# Patient Record
Sex: FEMALE | Race: WHITE | NOT HISPANIC OR LATINO | Employment: FULL TIME | URBAN - NONMETROPOLITAN AREA
[De-identification: names, ages, dates, MRNs, and addresses within clinical notes are randomized per-mention and may not be internally consistent; named-entity substitution may affect disease eponyms.]

---

## 2017-01-10 PROCEDURE — G0123 SCREEN CERV/VAG THIN LAYER: HCPCS | Performed by: OBSTETRICS & GYNECOLOGY

## 2017-01-11 ENCOUNTER — LAB REQUISITION (OUTPATIENT)
Dept: LAB | Facility: HOSPITAL | Age: 62
End: 2017-01-11

## 2017-01-11 DIAGNOSIS — Z12.72 ENCOUNTER FOR SCREENING FOR MALIGNANT NEOPLASM OF VAGINA: ICD-10-CM

## 2017-01-11 LAB
GEN CATEG CVX/VAG CYTO-IMP: NORMAL
LAB AP CASE REPORT: NORMAL
LAB AP GYN ADDITIONAL INFORMATION: NORMAL
Lab: NORMAL
PATH INTERP SPEC-IMP: NORMAL
STAT OF ADQ CVX/VAG CYTO-IMP: NORMAL

## 2017-01-17 ENCOUNTER — OFFICE VISIT (OUTPATIENT)
Dept: RETAIL CLINIC | Facility: CLINIC | Age: 62
End: 2017-01-17

## 2017-01-17 VITALS
OXYGEN SATURATION: 98 % | HEART RATE: 103 BPM | WEIGHT: 130.2 LBS | DIASTOLIC BLOOD PRESSURE: 78 MMHG | SYSTOLIC BLOOD PRESSURE: 128 MMHG | TEMPERATURE: 99.5 F | RESPIRATION RATE: 20 BRPM

## 2017-01-17 DIAGNOSIS — J40 BRONCHITIS: Primary | ICD-10-CM

## 2017-01-17 PROCEDURE — 99213 OFFICE O/P EST LOW 20 MIN: CPT | Performed by: NURSE PRACTITIONER

## 2017-01-17 RX ORDER — DESVENLAFAXINE 100 MG/1
100 TABLET, EXTENDED RELEASE ORAL DAILY
COMMUNITY

## 2017-01-17 RX ORDER — AZITHROMYCIN 250 MG/1
TABLET, FILM COATED ORAL
Qty: 6 TABLET | Refills: 0 | Status: SHIPPED | OUTPATIENT
Start: 2017-01-17 | End: 2017-11-29

## 2017-01-17 NOTE — PROGRESS NOTES
Subjective   Jenni Leon is a 61 y.o. female here for bronchitis.     Cough   This is a new problem. The current episode started 1 to 4 weeks ago (1 week). The problem has been gradually worsening. The problem occurs every few minutes. The cough is non-productive. Associated symptoms include headaches (coughing so much head hurts) and postnasal drip. Pertinent negatives include no chest pain, chills, ear congestion, ear pain, eye redness, fever, hemoptysis, myalgias, nasal congestion, rash, rhinorrhea, sore throat, shortness of breath, sweats, weight loss or wheezing. Nothing aggravates the symptoms. She has tried prescription cough suppressant for the symptoms. The treatment provided mild relief. Her past medical history is significant for bronchitis. There is no history of asthma or environmental allergies.       The following portions of the patient's history were reviewed and updated as appropriate: allergies, current medications, past family history, past medical history, past social history, past surgical history and problem list.    Review of Systems   Constitutional: Negative for activity change, chills, fever and weight loss.   HENT: Positive for postnasal drip. Negative for congestion, dental problem, ear pain, rhinorrhea and sore throat.    Eyes: Negative for discharge and redness.   Respiratory: Positive for cough. Negative for hemoptysis, choking, chest tightness, shortness of breath and wheezing.    Cardiovascular: Negative for chest pain.   Gastrointestinal: Negative for diarrhea, nausea and vomiting.   Genitourinary: Negative for dysuria.   Musculoskeletal: Negative for arthralgias, back pain, joint swelling and myalgias.   Skin: Negative for rash.   Allergic/Immunologic: Negative for environmental allergies, food allergies and immunocompromised state.   Neurological: Positive for headaches (coughing so much head hurts). Negative for seizures.   Psychiatric/Behavioral: Positive for dysphoric mood  (They just changed her back to Pristiq trying to manage depression).       Objective   Physical Exam   Constitutional: She is oriented to person, place, and time. She appears well-developed and well-nourished. No distress.   HENT:   Head: Normocephalic and atraumatic.   Right Ear: External ear normal.   Left Ear: External ear normal.   Nose: Nose normal.   Mouth/Throat: Oropharynx is clear and moist. No oropharyngeal exudate.   TM's without erythema or effusion; pharynx is erythematous with post-nasal drainage   Eyes: Conjunctivae and EOM are normal. Pupils are equal, round, and reactive to light. Right eye exhibits no discharge. Left eye exhibits no discharge. No scleral icterus.   Neck: Normal range of motion. Neck supple. No JVD present. No tracheal deviation present. No thyromegaly present.   Cardiovascular: Normal rate, regular rhythm and normal heart sounds.  Exam reveals no gallop and no friction rub.    No murmur heard.  Pulmonary/Chest: Effort normal and breath sounds normal. No stridor. No respiratory distress. She has no wheezes. She has no rales. She exhibits no tenderness.   A harsh, barking, persistent cough is noted throughout her time in the waiting room and exam room.   Abdominal: Soft. She exhibits no distension. There is no tenderness.   Musculoskeletal: Normal range of motion. She exhibits no edema, tenderness or deformity.   Lymphadenopathy:     She has no cervical adenopathy.   Neurological: She is alert and oriented to person, place, and time. She exhibits normal muscle tone. Coordination normal.   Skin: Skin is warm and dry. No rash noted. She is not diaphoretic. No erythema. No pallor.   Psychiatric: She has a normal mood and affect. Her behavior is normal. Judgment and thought content normal.   Vitals reviewed.      Assessment/Plan   Jenni was seen today for cough.    Diagnoses and all orders for this visit:    Bronchitis    Other orders  -     azithromycin (ZITHROMAX Z-YNES) 250 MG tablet;  Take 2 tablets the first day, then 1 tablet daily for 4 days.         She has Flonase and Claritin that she can add and use at home.  We have opted for no systemic steroid treatment due to the fact that she had hair loss with steroids in the past, and she is changing anti-depression medications.

## 2017-01-17 NOTE — PATIENT INSTRUCTIONS
Increase fluids and rest.  Use the Claritin and Flonase that you have at home. Follow up if any questions or concerns.

## 2017-01-17 NOTE — MR AVS SNAPSHOT
Jenni Leon   1/17/2017 1:15 PM   Office Visit    Dept Phone:  [phone]   Encounter #:  [id number]    Provider:  [facility] [place]   Department:  [facility]                Your Full Care Plan              Today's Medication Changes          These changes are accurate as of: 1/17/17  4:12 PM.  If you have any questions, ask your nurse or doctor.               New Medication(s)Ordered:     azithromycin 250 MG tablet   Commonly known as:  ZITHROMAX Z-YNES   Take 2 tablets the first day, then 1 tablet daily for 4 days.         Stop taking medication(s)listed here:     amoxicillin 875 MG tablet   Commonly known as:  AMOXIL           cefuroxime 250 MG tablet   Commonly known as:  CEFTIN           VIIBRYD 10 MG tablet tablet   Generic drug:  vilazodone                Where to Get Your Medications      These medications were sent to [facility]/pharmacy #[id number] [place], [place] - [address] - [phone] [place] [phone] [place] [id number]     Phone:  [phone]     azithromycin 250 MG tablet                  Your Updated Medication List          This list is accurate as of: 1/17/17  4:12 PM.  Always use your most recent med list.                azithromycin 250 MG tablet   Commonly known as:  ZITHROMAX Z-YNES   Take 2 tablets the first day, then 1 tablet daily for 4 days.       esomeprazole 40 MG capsule   Commonly known as:  nexIUM       ESTRACE 1 MG tablet   Generic drug:  estradiol       loratadine 10 MG tablet   Commonly known as:  CLARITIN   Take 1 tablet by mouth Daily.       PRISTIQ 100 MG 24 hr tablet   Generic drug:  desvenlafaxine       promethazine-phenylephrine 6.25-5 MG/5ML syrup syrup   Take 5 mL by mouth Every 6 (Six) Hours As Needed (cough).               You Were Diagnosed With        Codes Comments    Bronchitis    -  Primary ICD-10-CM: J40  ICD-9-CM: 490       Instructions    Increase fluids and rest.  Use the Claritin and Flonase  that you have at home. Follow up if any questions or concerns.       Patient Instructions History      Upcoming Appointments     Visit Type Date Time Department    OFFICE VISIT 2017  1:15 PM [facility] [place]      [employer] Signup     [facility] allows you to send messages to your doctor, view your test results, renew your prescriptions, schedule appointments, and more. To sign up, go to [employer] and click on the Sign Up Now link in the New User? box. Enter your [employer] Activation Code exactly as it appears below along with the last four digits of your Social Security Number and your Date of Birth () to complete the sign-up process. If you do not sign up before the expiration date, you must request a new code.    Wish[employer]ie Activation Code: [email or web address]  Expires: 2017  3:27 PM    If you have questions, you can email [email or web address] or call [phone] to talk to our [employer] staff. Remember, [employer]ie is NOT to be used for urgent needs. For medical emergencies, dial 911.               Other Info from Your Visit           Allergies     No Known Allergies      Reason for Visit     Cough           Vital Signs     Blood Pressure Pulse Temperature Respirations Weight Oxygen Saturation    128/78 103 99.5 °F (37.5 °C) (Oral) 20 130 lb 3.2 oz (59.1 kg) 98%    Smoking Status                   Never Smoker           Problems and Diagnoses Noted     Bronchitis    -  Primary

## 2017-01-18 ENCOUNTER — TELEPHONE (OUTPATIENT)
Dept: RETAIL CLINIC | Facility: CLINIC | Age: 62
End: 2017-01-18

## 2017-01-18 NOTE — TELEPHONE ENCOUNTER
She is still coughing and wonders about a different cough syrup. We again discussed steroid and also steroid and/or proventil inhaler, all of which she defers at this time. She defers Tessalon Perles, which she has tried before. Will try to take 2 tsp Phenergan VC tonight and see if it helps cough. If not, she will call tomorrow.

## 2017-01-23 RX ORDER — ESOMEPRAZOLE MAGNESIUM 40 MG/1
40 CAPSULE, DELAYED RELEASE ORAL DAILY
Qty: 30 CAPSULE | Refills: 3 | Status: SHIPPED | OUTPATIENT
Start: 2017-01-23 | End: 2017-11-29

## 2017-07-05 ENCOUNTER — LAB REQUISITION (OUTPATIENT)
Dept: LAB | Facility: HOSPITAL | Age: 62
End: 2017-07-05

## 2017-07-05 DIAGNOSIS — Z00.00 ENCOUNTER FOR GENERAL ADULT MEDICAL EXAMINATION WITHOUT ABNORMAL FINDINGS: ICD-10-CM

## 2017-07-05 PROCEDURE — 88305 TISSUE EXAM BY PATHOLOGIST: CPT | Performed by: OBSTETRICS & GYNECOLOGY

## 2017-07-07 LAB
CYTO UR: NORMAL
LAB AP CASE REPORT: NORMAL
LAB AP CLINICAL INFORMATION: NORMAL
LAB AP INTRADEPARTMENTAL CONSULT: NORMAL
Lab: NORMAL
PATH REPORT.FINAL DX SPEC: NORMAL
PATH REPORT.GROSS SPEC: NORMAL

## 2017-10-03 ENCOUNTER — OFFICE VISIT (OUTPATIENT)
Dept: RETAIL CLINIC | Facility: CLINIC | Age: 62
End: 2017-10-03

## 2017-10-03 DIAGNOSIS — Z23 NEEDS FLU SHOT: Primary | ICD-10-CM

## 2017-10-04 VITALS — RESPIRATION RATE: 20 BRPM | OXYGEN SATURATION: 99 % | TEMPERATURE: 98.7 F | HEART RATE: 89 BPM

## 2017-10-04 NOTE — PROGRESS NOTES
"Patient seen for flu vaccine.  No complaints except a little \"sniffly\" today.  NKA, no hx of vaccine reaction.  I gave Fluzone IM left deltoid, and patient tolerated this well.   "

## 2017-11-29 ENCOUNTER — OFFICE VISIT (OUTPATIENT)
Dept: RETAIL CLINIC | Facility: CLINIC | Age: 62
End: 2017-11-29

## 2017-11-29 DIAGNOSIS — J40 BRONCHITIS: Primary | ICD-10-CM

## 2017-11-29 DIAGNOSIS — J06.9 ACUTE URI: ICD-10-CM

## 2017-11-29 PROCEDURE — 99213 OFFICE O/P EST LOW 20 MIN: CPT | Performed by: NURSE PRACTITIONER

## 2017-11-29 RX ORDER — AZITHROMYCIN 250 MG/1
TABLET, FILM COATED ORAL
Qty: 6 TABLET | Refills: 0 | Status: SHIPPED | OUTPATIENT
Start: 2017-11-29 | End: 2018-11-28

## 2017-11-29 RX ORDER — PROMETHAZINE HYDROCHLORIDE AND PHENYLEPHRINE HYDROCHLORIDE 6.25; 5 MG/5ML; MG/5ML
5 SYRUP ORAL EVERY 6 HOURS PRN
Qty: 240 ML | Refills: 0 | Status: SHIPPED | OUTPATIENT
Start: 2017-11-29 | End: 2018-11-28 | Stop reason: SDUPTHER

## 2017-11-29 RX ORDER — PREGABALIN 50 MG/1
50 CAPSULE ORAL 3 TIMES DAILY
COMMUNITY
End: 2020-07-09 | Stop reason: ALTCHOICE

## 2017-11-29 RX ORDER — FLUTICASONE PROPIONATE 50 MCG
1 SPRAY, SUSPENSION (ML) NASAL DAILY
Qty: 1 BOTTLE | Refills: 0 | Status: SHIPPED | OUTPATIENT
Start: 2017-11-29 | End: 2017-12-29

## 2017-12-01 VITALS
HEART RATE: 100 BPM | TEMPERATURE: 98.6 F | SYSTOLIC BLOOD PRESSURE: 114 MMHG | DIASTOLIC BLOOD PRESSURE: 85 MMHG | RESPIRATION RATE: 20 BRPM | OXYGEN SATURATION: 96 % | WEIGHT: 138 LBS

## 2017-12-01 NOTE — PROGRESS NOTES
Subjective   Jenni Leon is a 62 y.o. female here for cough.     Cough   This is a new problem. The current episode started 1 to 4 weeks ago (3 weeks). The problem has been gradually worsening. The problem occurs every few minutes. The cough is non-productive. Associated symptoms include nasal congestion, postnasal drip and a sore throat (feels raw). Pertinent negatives include no chest pain, chills, ear congestion, ear pain, eye redness, fever, headaches, heartburn, hemoptysis, myalgias, rash, rhinorrhea, shortness of breath, sweats, weight loss or wheezing. The symptoms are aggravated by lying down. She has tried nothing for the symptoms. Her past medical history is significant for bronchitis. There is no history of asthma, COPD, emphysema, environmental allergies or pneumonia.   Sore Throat    Associated symptoms include congestion and coughing. Pertinent negatives include no abdominal pain, diarrhea, ear pain, headaches, neck pain, shortness of breath, stridor, trouble swallowing or vomiting.       The following portions of the patient's history were reviewed and updated as appropriate: allergies, current medications, past family history, past medical history, past social history, past surgical history and problem list.    Review of Systems   Constitutional: Negative for activity change, appetite change, chills, diaphoresis, fatigue, fever and weight loss.   HENT: Positive for congestion, postnasal drip, sore throat (feels raw) and voice change (a little hoarse). Negative for ear pain, mouth sores, nosebleeds, rhinorrhea, sinus pain, sinus pressure and trouble swallowing.    Eyes: Negative for discharge and redness.   Respiratory: Positive for cough. Negative for apnea, hemoptysis, chest tightness, shortness of breath, wheezing and stridor.    Cardiovascular: Negative for chest pain, palpitations and leg swelling.   Gastrointestinal: Negative for abdominal pain, diarrhea, heartburn, nausea and vomiting.    Genitourinary: Negative for dysuria.   Musculoskeletal: Negative for arthralgias, back pain, gait problem, joint swelling, myalgias, neck pain and neck stiffness.   Skin: Negative for color change, pallor and rash.   Allergic/Immunologic: Negative for environmental allergies, food allergies and immunocompromised state.   Neurological: Negative for seizures and headaches.   Hematological: Negative for adenopathy.   Psychiatric/Behavioral: Negative for agitation and behavioral problems.       Objective   Physical Exam   Constitutional: She is oriented to person, place, and time. She appears well-developed and well-nourished. No distress.   HENT:   Head: Normocephalic and atraumatic.   Right Ear: External ear normal.   Left Ear: External ear normal.   Nose: Nose normal.   Mouth/Throat: Oropharynx is clear and moist. No oropharyngeal exudate (post-nasal drainage noted).   TM's WNL bilaterally   Eyes: Conjunctivae and EOM are normal. Pupils are equal, round, and reactive to light. Right eye exhibits no discharge. Left eye exhibits no discharge. No scleral icterus.   Neck: Normal range of motion. Neck supple. No JVD present. No tracheal deviation present. No thyromegaly present.   Cardiovascular: Normal rate, regular rhythm and normal heart sounds.  Exam reveals no gallop and no friction rub.    No murmur heard.  Pulmonary/Chest: Effort normal and breath sounds normal. No stridor. No respiratory distress. She has no wheezes. She has no rales.   hacky cough noted   Abdominal: Soft. She exhibits no distension. There is no tenderness.   Musculoskeletal: Normal range of motion. She exhibits no edema, tenderness or deformity.   Lymphadenopathy:     She has no cervical adenopathy.   Neurological: She is alert and oriented to person, place, and time. She exhibits normal muscle tone. Coordination normal.   Skin: Skin is warm and dry. No rash noted. She is not diaphoretic. No erythema. No pallor.   Psychiatric: She has a normal  mood and affect. Her behavior is normal. Judgment and thought content normal.   Nursing note and vitals reviewed.      Assessment/Plan   Jenni was seen today for cough and sore throat.    Diagnoses and all orders for this visit:    Bronchitis    Acute URI    Other orders  -     azithromycin (ZITHROMAX Z-YNES) 250 MG tablet; Take 2 tablets the first day, then 1 tablet daily for 4 days.  -     promethazine-phenylephrine 6.25-5 MG/5ML syrup syrup; Take 5 mL by mouth Every 6 (Six) Hours As Needed (cough).  -     fluticasone (FLONASE) 50 MCG/ACT nasal spray; 1 spray into each nostril Daily for 30 days.

## 2018-10-17 ENCOUNTER — OFFICE VISIT (OUTPATIENT)
Dept: RETAIL CLINIC | Facility: CLINIC | Age: 63
End: 2018-10-17

## 2018-10-17 VITALS — TEMPERATURE: 98.8 F

## 2018-10-17 DIAGNOSIS — Z23 NEED FOR VACCINATION: Primary | ICD-10-CM

## 2018-11-28 ENCOUNTER — OFFICE VISIT (OUTPATIENT)
Dept: RETAIL CLINIC | Facility: CLINIC | Age: 63
End: 2018-11-28

## 2018-11-28 VITALS
HEART RATE: 96 BPM | RESPIRATION RATE: 20 BRPM | OXYGEN SATURATION: 98 % | DIASTOLIC BLOOD PRESSURE: 88 MMHG | SYSTOLIC BLOOD PRESSURE: 130 MMHG | TEMPERATURE: 97.7 F

## 2018-11-28 DIAGNOSIS — J06.9 ACUTE URI: ICD-10-CM

## 2018-11-28 DIAGNOSIS — J40 BRONCHITIS: Primary | ICD-10-CM

## 2018-11-28 PROCEDURE — 99213 OFFICE O/P EST LOW 20 MIN: CPT | Performed by: NURSE PRACTITIONER

## 2018-11-28 RX ORDER — FLUTICASONE PROPIONATE 50 MCG
1 SPRAY, SUSPENSION (ML) NASAL DAILY
Qty: 1 BOTTLE | Refills: 0 | Status: SHIPPED | OUTPATIENT
Start: 2018-11-28 | End: 2018-12-24 | Stop reason: SDUPTHER

## 2018-11-28 RX ORDER — ALBUTEROL SULFATE 90 UG/1
1 AEROSOL, METERED RESPIRATORY (INHALATION) EVERY 6 HOURS PRN
Qty: 1 INHALER | Refills: 0 | Status: SHIPPED | OUTPATIENT
Start: 2018-11-28 | End: 2020-07-09

## 2018-11-28 RX ORDER — PROMETHAZINE HYDROCHLORIDE AND PHENYLEPHRINE HYDROCHLORIDE 6.25; 5 MG/5ML; MG/5ML
5 SYRUP ORAL EVERY 6 HOURS PRN
Qty: 240 ML | Refills: 0 | Status: SHIPPED | OUTPATIENT
Start: 2018-11-28 | End: 2020-07-09

## 2018-11-28 RX ORDER — CEFUROXIME AXETIL 250 MG/1
250 TABLET ORAL 2 TIMES DAILY
Qty: 20 TABLET | Refills: 0 | Status: SHIPPED | OUTPATIENT
Start: 2018-11-28 | End: 2020-07-09

## 2018-11-28 NOTE — PROGRESS NOTES
Subjective   Jenni Leon is a 63 y.o. female seen for cough.     Cough   This is a new problem. The current episode started 1 to 4 weeks ago. The problem has been gradually worsening. The problem occurs every few minutes. Cough characteristics: Mostly dry, occasionally productive of yellow sputum. Associated symptoms include nasal congestion and postnasal drip. Pertinent negatives include no chest pain, chills, ear congestion, ear pain, fever, headaches, hemoptysis, myalgias, rash, rhinorrhea, sore throat, shortness of breath, weight loss or wheezing. Nothing aggravates the symptoms. She has tried prescription cough suppressant for the symptoms. The treatment provided mild relief. Her past medical history is significant for bronchitis. There is no history of asthma, COPD, emphysema, environmental allergies or pneumonia.       The following portions of the patient's history were reviewed and updated as appropriate: allergies, current medications, past family history, past medical history, past social history, past surgical history and problem list.    Review of Systems   Constitutional: Negative for chills, fever and weight loss.   HENT: Positive for congestion and postnasal drip. Negative for ear pain, rhinorrhea, sinus pressure, sore throat, trouble swallowing and voice change.    Respiratory: Positive for cough. Negative for hemoptysis, shortness of breath and wheezing.    Cardiovascular: Negative for chest pain and leg swelling.   Gastrointestinal: Positive for nausea. Negative for abdominal pain, diarrhea and vomiting.   Genitourinary: Negative for dysuria.   Musculoskeletal: Negative for myalgias.   Skin: Negative for rash.   Allergic/Immunologic: Negative for environmental allergies.   Neurological: Negative for headaches.       Objective   Physical Exam   Constitutional: She is oriented to person, place, and time. She appears well-developed and well-nourished. No distress.   HENT:   Head: Normocephalic  and atraumatic.   Right Ear: External ear normal.   Left Ear: External ear normal.   Nose: Nose normal.   Mouth/Throat: No oropharyngeal exudate (post-nasal drainage).   TM's WNL   Eyes: Conjunctivae and EOM are normal. Pupils are equal, round, and reactive to light. Right eye exhibits no discharge. Left eye exhibits no discharge. No scleral icterus.   Neck: Normal range of motion. Neck supple. No JVD present. No tracheal deviation present. No thyromegaly present.   Cardiovascular: Normal rate and regular rhythm. Exam reveals no gallop and no friction rub.   No murmur heard.  Pulmonary/Chest: Effort normal and breath sounds normal. No respiratory distress. She has no wheezes. She has no rales.   Persistent, barky cough noted throughout the time of the exam.   Abdominal: Soft. She exhibits no distension. There is no tenderness.   Musculoskeletal: Normal range of motion. She exhibits no edema or deformity.   Lymphadenopathy:     She has no cervical adenopathy.   Neurological: She is alert and oriented to person, place, and time. She exhibits normal muscle tone. Coordination normal.   Skin: Skin is warm and dry. No rash noted. She is not diaphoretic. No erythema. No pallor.   Psychiatric: She has a normal mood and affect. Her behavior is normal. Judgment and thought content normal.   Nursing note and vitals reviewed.      Assessment/Plan   Jenni was seen today for cough.    Diagnoses and all orders for this visit:    Bronchitis    Acute URI    Other orders  -     fluticasone (FLONASE) 50 MCG/ACT nasal spray; 1 spray into the nostril(s) as directed by provider Daily for 30 days.  -     promethazine-phenylephrine 6.25-5 MG/5ML syrup syrup; Take 5 mL by mouth Every 6 (Six) Hours As Needed (cough).  -     cefuroxime (CEFTIN) 250 MG tablet; Take 1 tablet by mouth 2 (Two) Times a Day.  -     albuterol (PROVENTIL HFA;VENTOLIN HFA) 108 (90 Base) MCG/ACT inhaler; Inhale 1 puff Every 6 (Six) Hours As Needed for Wheezing or  Shortness of Air (cough).       Have asked her to let me know if sx worsen or do not improve with treatment. She defers steroid shot because her hair falls out with steroids.

## 2018-12-25 RX ORDER — FLUTICASONE PROPIONATE 50 MCG
SPRAY, SUSPENSION (ML) NASAL
Qty: 16 ML | Refills: 0 | Status: SHIPPED | OUTPATIENT
Start: 2018-12-25 | End: 2020-07-09

## 2019-01-24 RX ORDER — FLUTICASONE PROPIONATE 50 MCG
SPRAY, SUSPENSION (ML) NASAL
Qty: 16 ML | Refills: 0 | OUTPATIENT
Start: 2019-01-24

## 2019-03-23 ENCOUNTER — NURSE TRIAGE (OUTPATIENT)
Dept: CALL CENTER | Facility: HOSPITAL | Age: 64
End: 2019-03-23

## 2019-03-23 NOTE — TELEPHONE ENCOUNTER
Reviewed guideline with caller, advises she be seen within 4 hours. Caller states she just now checked and her temperature is down to 100.4. Advised caller to be seen today. Caller agrees to this advice.     Reason for Disposition  • [1] Fever > 101 F (38.3 C) AND [2] age > 60    Additional Information  • Negative: Shock suspected (e.g., cold/pale/clammy skin, too weak to stand, low BP, rapid pulse)  • Negative: Difficult to awaken or acting confused (e.g., disoriented, slurred speech)  • Negative: [1] Difficulty breathing AND [2] bluish lips, tongue or face  • Negative: New onset rash with multiple purple (or blood-colored) spots or dots  • Negative: Sounds like a life-threatening emergency to the triager  • Negative: Fever in a cancer patient who is currently (or recently) receiving chemotherapy or radiation therapy, or cancer patient who has metastatic or end-stage cancer and is receiving palliative care  • Negative: Pregnant  • Negative: Fever onset within 24 hours of receiving vaccine  • Negative: [1] Fever AND [2] within 21 days of Ebola EXPOSURE  • Negative: Other symptom is present, see that guideline  (e.g., symptoms of cough, runny nose, sore throat, earache, abdominal pain, diarrhea, vomiting)  • Negative: [1] Headache AND [2] stiff neck (can't touch chin to chest)  • Negative: Difficulty breathing  • Negative: IV drug abuse  • Negative: [1] Drinking very little AND [2] dehydration suspected (e.g., no urine > 12 hours, very dry mouth, very lightheaded)  • Negative: Patient sounds very sick or weak to the triager  (Exception: mild weakness and hasn't taken fever medicine)  • Negative: Fever > 104 F (40 C)  • Negative: [1] Fever > 100.0 F (37.8 C) AND [2] bedridden (e.g., nursing home patient, CVA, chronic illness, recovering from surgery)  • Negative: [1] Fever > 100.0 F (37.8 C) AND [2] indwelling urinary catheter (e.g., Dug[place], Coude)  • Negative: [1] Fever > 100.0 F (37.8 C) AND [2] has port (portacath),  "central line, or PICC line  • Negative: [1] Fever > 100.0 F (37.8 C) AND [2] diabetes mellitus or weak immune system (e.g., HIV positive, cancer chemo, splenectomy, chronic steroids)  • Negative: [1] Fever > 100.0 F (37.8 C) AND [2] surgery in the last month  • Negative: Transplant patient (e.g., kidney, liver, lung, heart)  • Negative: Fever present > 3 days (72 hours)  • Negative: [1] Fever > 100.0 F (37.8 C) AND [2] foreign travel to a developing country in the last month  • Negative: [1] Intermittent fever > 100.0 F (37.8 C) AND [2] lasts > 3 weeks    Answer Assessment - Initial Assessment Questions  1. TEMPERATURE: \"What is the most recent temperature?\"  \"How was it measured?\"       104.7, 102, temporal thermometer  2. ONSET: \"When did the fever start?\"       tonight  3. SYMPTOMS: \"Do you have any other symptoms besides the fever?\"  (e.g., colds, headache, sore throat, earache, cough, rash, diarrhea, vomiting, abdominal pain)      Sinus congestion and cough, headache,   4. CAUSE: If there are no symptoms, ask: \"What do you think is causing the fever?\"       Sinus congestion  5. CONTACTS: \"Does anyone else in the family have an infection?\"      no  6. TREATMENT: \"What have you done so far to treat this fever?\" (e.g., medications)      Ibuprofen 400mg  7. IMMUNOCOMPROMISE: \"Do you have of the following: diabetes, HIV positive, splenectomy, cancer chemotherapy, chronic steroid treatment, transplant patient, etc.\"      no  8. PREGNANCY: \"Is there any chance you are pregnant?\" \"When was your last menstrual period?\"      no  9. TRAVEL: \"Have you traveled out of the country in the last month?\" (e.g., travel history, exposures)      no    Protocols used: FEVER-ADULT-AH      "

## 2019-09-05 ENCOUNTER — TRANSCRIBE ORDERS (OUTPATIENT)
Dept: ADMINISTRATIVE | Facility: HOSPITAL | Age: 64
End: 2019-09-05

## 2019-09-05 DIAGNOSIS — R07.89 OTHER CHEST PAIN: Primary | ICD-10-CM

## 2019-09-09 ENCOUNTER — HOSPITAL ENCOUNTER (OUTPATIENT)
Dept: CARDIOLOGY | Facility: HOSPITAL | Age: 64
Discharge: HOME | End: 2019-09-09
Admitting: INTERNAL MEDICINE

## 2019-09-09 VITALS
BODY MASS INDEX: 22.33 KG/M2 | HEIGHT: 65 IN | WEIGHT: 134 LBS | HEART RATE: 80 BPM | SYSTOLIC BLOOD PRESSURE: 126 MMHG | DIASTOLIC BLOOD PRESSURE: 70 MMHG

## 2019-09-09 PROCEDURE — 25010000002 PERFLUTREN 6.52 MG/ML SUSPENSION: Performed by: INTERNAL MEDICINE

## 2019-09-09 PROCEDURE — 93017 CV STRESS TEST TRACING ONLY: CPT

## 2019-09-09 PROCEDURE — 93018 CV STRESS TEST I&R ONLY: CPT | Performed by: INTERNAL MEDICINE

## 2019-09-09 PROCEDURE — 93350 STRESS TTE ONLY: CPT | Performed by: INTERNAL MEDICINE

## 2019-09-09 PROCEDURE — 93352 ADMIN ECG CONTRAST AGENT: CPT | Performed by: INTERNAL MEDICINE

## 2019-09-09 PROCEDURE — 93350 STRESS TTE ONLY: CPT

## 2019-09-09 RX ORDER — ROSUVASTATIN CALCIUM 5 MG/1
5 TABLET, COATED ORAL 3 TIMES WEEKLY
COMMUNITY
End: 2021-10-08

## 2019-09-09 RX ORDER — MELOXICAM 7.5 MG/1
7.5 TABLET ORAL DAILY
COMMUNITY

## 2019-09-09 RX ADMIN — PERFLUTREN 8.48 MG: 6.52 INJECTION, SUSPENSION INTRAVENOUS at 14:33

## 2019-09-10 LAB
BH CV STRESS BP STAGE 1: NORMAL
BH CV STRESS BP STAGE 2: NORMAL
BH CV STRESS DURATION MIN STAGE 1: 3
BH CV STRESS DURATION MIN STAGE 2: 3
BH CV STRESS DURATION SEC STAGE 1: 0
BH CV STRESS DURATION SEC STAGE 2: 0
BH CV STRESS GRADE STAGE 1: 10
BH CV STRESS GRADE STAGE 2: 12
BH CV STRESS HR STAGE 1: 126
BH CV STRESS HR STAGE 2: 159
BH CV STRESS METS STAGE 1: 5
BH CV STRESS METS STAGE 2: 7.5
BH CV STRESS PROTOCOL 1: NORMAL
BH CV STRESS RECOVERY BP: NORMAL MMHG
BH CV STRESS RECOVERY HR: 96 BPM
BH CV STRESS SPEED STAGE 1: 1.7
BH CV STRESS SPEED STAGE 2: 2.5
BH CV STRESS STAGE 1: 1
BH CV STRESS STAGE 2: 2
MAXIMAL PREDICTED HEART RATE: 158 BPM
PERCENT MAX PREDICTED HR: 100.63 %
STRESS BASELINE BP: NORMAL MMHG
STRESS BASELINE HR: 80 BPM
STRESS PERCENT HR: 118 %
STRESS POST ESTIMATED WORKLOAD: 7.5 METS
STRESS POST EXERCISE DUR MIN: 6 MIN
STRESS POST EXERCISE DUR SEC: 0 SEC
STRESS POST PEAK BP: NORMAL MMHG
STRESS POST PEAK HR: 159 BPM
STRESS TARGET HR: 134 BPM

## 2019-10-02 ENCOUNTER — IMMUNIZATION (OUTPATIENT)
Dept: RETAIL CLINIC | Facility: CLINIC | Age: 64
End: 2019-10-02

## 2019-10-02 DIAGNOSIS — Z23 NEED FOR VACCINATION: Primary | ICD-10-CM

## 2019-10-02 PROCEDURE — 90686 IIV4 VACC NO PRSV 0.5 ML IM: CPT | Performed by: NURSE PRACTITIONER

## 2019-10-02 PROCEDURE — 90471 IMMUNIZATION ADMIN: CPT | Performed by: NURSE PRACTITIONER

## 2019-10-06 VITALS — TEMPERATURE: 98.3 F

## 2019-10-06 NOTE — PROGRESS NOTES
Seen for flu vaccination. No history of reaction to vaccines, no egg allergy, is not feeling sick today.   Flu shot given IM by me without complication. Patient tolerated well.  Patient information handout given.

## 2020-07-09 ENCOUNTER — OFFICE VISIT (OUTPATIENT)
Dept: GASTROENTEROLOGY | Facility: CLINIC | Age: 65
End: 2020-07-09

## 2020-07-09 VITALS
OXYGEN SATURATION: 99 % | BODY MASS INDEX: 22.33 KG/M2 | SYSTOLIC BLOOD PRESSURE: 128 MMHG | DIASTOLIC BLOOD PRESSURE: 76 MMHG | WEIGHT: 134 LBS | TEMPERATURE: 98.2 F | HEART RATE: 90 BPM | HEIGHT: 65 IN

## 2020-07-09 DIAGNOSIS — Z12.11 COLON CANCER SCREENING: Primary | ICD-10-CM

## 2020-07-09 DIAGNOSIS — Z83.71 FAMILY HISTORY OF POLYPS IN THE COLON: ICD-10-CM

## 2020-07-09 PROBLEM — Z83.719 FAMILY HISTORY OF POLYPS IN THE COLON: Status: ACTIVE | Noted: 2020-07-09

## 2020-07-09 PROCEDURE — S0285 CNSLT BEFORE SCREEN COLONOSC: HCPCS | Performed by: NURSE PRACTITIONER

## 2020-07-09 RX ORDER — BUSPIRONE HYDROCHLORIDE 7.5 MG/1
7.5 TABLET ORAL AS NEEDED
COMMUNITY
End: 2020-08-10

## 2020-07-09 RX ORDER — SODIUM, POTASSIUM,MAG SULFATES 17.5-3.13G
1 SOLUTION, RECONSTITUTED, ORAL ORAL EVERY 12 HOURS
Qty: 2 BOTTLE | Refills: 0 | Status: ON HOLD | OUTPATIENT
Start: 2020-07-09 | End: 2020-07-17

## 2020-07-09 RX ORDER — GABAPENTIN 300 MG/1
300 CAPSULE ORAL 2 TIMES DAILY
COMMUNITY
Start: 2020-04-27

## 2020-07-09 NOTE — H&P (VIEW-ONLY)
Chief Complaint   Patient presents with   • Colon Cancer Screening     Pt presents today for colon recall-last colon was 5/14/2015; Family history of colon polyps     Subjective   HPI  Jenni Ahn is a 64 y.o. female who presents as a referral for preventative maintenance. She has no complaints of nausea or vomiting. No change in bowels. No wt loss. No BRBPR. No melena. There is no family hx for colon cancer. No abdominal pain. There was no Cologuard screening this year.  Patient's last colonoscopy was performed on 5/14/2015 found to be normal.    Past Medical History:   Diagnosis Date   • Allergic    • Anxiety    • Depression    • Elevated cholesterol    • Family history of colonic polyps    • GERD (gastroesophageal reflux disease)    • IBS (irritable bowel syndrome)    • Kidney stone    • Sinusitis    • Skin cancer    • Urinary tract infection      Past Surgical History:   Procedure Laterality Date   • APPENDECTOMY     • BREAUX PROCEDURE  07/29/2015   • COLONOSCOPY  05/14/2015    Normal; Repeat 5 years   • COLONOSCOPY  08/17/2007    Normal; Repeat 7 years   • DIAGNOSTIC LAPAROSCOPY EXPLORATORY LAPAROTOMY      For endometriosis   • ENDOSCOPY  07/29/2015    Medium-sized HH; Normal stomach; Normal examined duodenum; BRAVO pH capsule deployed; No specimens collected   • ENDOSCOPY  05/14/2015    HH; Schatzki's ring-dilated to 20mm; LA grade B esophagitis   • HYSTERECTOMY     • TONSILLECTOMY         Current Outpatient Medications:   •  busPIRone (BUSPAR) 7.5 MG tablet, Take 7.5 mg by mouth As Needed., Disp: , Rfl:   •  desvenlafaxine (PRISTIQ) 100 MG 24 hr tablet, Take 100 mg by mouth Daily., Disp: , Rfl:   •  esomeprazole (nexIUM) 40 MG capsule, Take 1 capsule by mouth every morning (before breakfast), Disp: , Rfl:   •  estradiol (ESTRACE) 1 MG tablet, Take 0.5 mg by mouth Daily., Disp: , Rfl:   •  gabapentin (NEURONTIN) 300 MG capsule, Take 300 mg by mouth 2 (Two) Times a Day., Disp: , Rfl:   •  meloxicam (MOBIC) 7.5  "MG tablet, Take 7.5 mg by mouth Daily., Disp: , Rfl:   •  rosuvastatin (CRESTOR) 5 MG tablet, Take 5 mg by mouth 1 (One) Time Per Week., Disp: , Rfl:   •  sodium-potassium-magnesium sulfates (Suprep Bowel Prep Kit) 17.5-3.13-1.6 GM/177ML solution oral solution, Take 1 bottle by mouth Every 12 (Twelve) Hours. Split dose prep as directed by office instructions provided.  2 bottles = one kit., Disp: 2 bottle, Rfl: 0  No Known Allergies  Social History     Socioeconomic History   • Marital status: Legally      Spouse name: Not on file   • Number of children: Not on file   • Years of education: Not on file   • Highest education level: Not on file   Tobacco Use   • Smoking status: Never Smoker   • Smokeless tobacco: Never Used   Substance and Sexual Activity   • Alcohol use: Yes     Comment: Rarely   • Drug use: Never   • Sexual activity: Yes     Family History   Problem Relation Age of Onset   • Breast cancer Mother    • Colon polyps Mother    • Stroke Father    • Colon cancer Neg Hx    • Esophageal cancer Neg Hx    • Liver cancer Neg Hx    • Liver disease Neg Hx    • Rectal cancer Neg Hx    • Stomach cancer Neg Hx        REVIEW OF SYSTEMS  General: well appearing, no fever chills or sweats, no unexplained wt loss  HEENT: no acute visual or hearing disturbances  Cardiovascular: No chest pain or palpitations  Pulmonary: No shortness of breath, coughing, wheezing or hemoptysis  : No burning, urgency, hematuria, or dysuria  Musculoskeletal: No joint pain or stiffness  Peripheral: no edema  Skin: No lesions or rashes  Neuro: No dizziness, headaches, stroke, syncope  Endocrine: No hot or cold intolerances  Hematological: No blood dyscrasias    Objective   Vitals:    07/09/20 1424   BP: 128/76   BP Location: Left arm   Patient Position: Sitting   Cuff Size: Adult   Pulse: 90   Temp: 98.2 °F (36.8 °C)   TempSrc: Tympanic   SpO2: 99%   Weight: 60.8 kg (134 lb)   Height: 163.8 cm (64.5\")     Body mass index is 22.65 " kg/m².    PHYSICAL EXAM  General: age appropriate well nourished well appearing, no acute distress  Head: normocephalic and atraumatic  Global assessment-supple  Neck-No JVD noted, no lymphadenopathy  Pulmonary-clear to auscultation bilaterally, normal respiratory effort  Cardiovascular-normal rate and rhythm, normal heart sounds, S1 and S2 noted  Abdomen-soft, non tender, non distended, normal bowel sounds all 4 quadrants, no hepatosplenomegaly noted  Extremities-No clubbing cyanosis or edema  Neuro-Non focal, converses appropriately, awake, alert, oriented        Imaging Results (Most Recent)     None        Assessment/Plan   Jenni was seen today for colon cancer screening.    Diagnoses and all orders for this visit:    Colon cancer screening  -     Case Request; Standing  -     Case Request    Family history of polyps in the colon  Comments:  mother   Orders:  -     Case Request; Standing  -     Case Request    Other orders  -     Follow Anesthesia Guidelines / Protocol; Future  -     Obtain Informed Consent; Future  -     sodium-potassium-magnesium sulfates (Suprep Bowel Prep Kit) 17.5-3.13-1.6 GM/177ML solution oral solution; Take 1 bottle by mouth Every 12 (Twelve) Hours. Split dose prep as directed by office instructions provided.  2 bottles = one kit.      COLONOSCOPY WITH ANESTHESIA (N/A)       Body mass index is 22.65 kg/m². Patient's Body mass index is 22.65 kg/m². BMI is within normal parameters. No follow-up required..      All risks, benefits, alternatives, and indications of colonoscopy procedure have been discussed with the patient. Risks to include perforation of the colon requiring possible surgery or colostomy, risk of bleeding from biopsies or removal of colon tissue, possibility of missing a colon polyp or cancer, or adverse drug reaction.  Benefits to include the diagnosis and management of disease of the colon and rectum. Alternatives to include barium enema, radiographic evaluation, lab  testing or no intervention. Pt verbalizes understanding and agrees.

## 2020-07-10 ENCOUNTER — TRANSCRIBE ORDERS (OUTPATIENT)
Dept: ADMINISTRATIVE | Facility: HOSPITAL | Age: 65
End: 2020-07-10

## 2020-07-10 DIAGNOSIS — Z01.818 PRE-OP TESTING: Primary | ICD-10-CM

## 2020-07-14 ENCOUNTER — LAB (OUTPATIENT)
Dept: LAB | Facility: HOSPITAL | Age: 65
End: 2020-07-14

## 2020-07-14 PROCEDURE — U0003 INFECTIOUS AGENT DETECTION BY NUCLEIC ACID (DNA OR RNA); SEVERE ACUTE RESPIRATORY SYNDROME CORONAVIRUS 2 (SARS-COV-2) (CORONAVIRUS DISEASE [COVID-19]), AMPLIFIED PROBE TECHNIQUE, MAKING USE OF HIGH THROUGHPUT TECHNOLOGIES AS DESCRIBED BY CMS-2020-01-R: HCPCS | Performed by: INTERNAL MEDICINE

## 2020-07-14 PROCEDURE — C9803 HOPD COVID-19 SPEC COLLECT: HCPCS | Performed by: INTERNAL MEDICINE

## 2020-07-15 LAB
COVID LABCORP PRIORITY: NORMAL
SARS-COV-2 RNA RESP QL NAA+PROBE: NOT DETECTED

## 2020-07-17 ENCOUNTER — ANESTHESIA (OUTPATIENT)
Dept: GASTROENTEROLOGY | Facility: HOSPITAL | Age: 65
End: 2020-07-17

## 2020-07-17 ENCOUNTER — TELEPHONE (OUTPATIENT)
Dept: GASTROENTEROLOGY | Facility: CLINIC | Age: 65
End: 2020-07-17

## 2020-07-17 ENCOUNTER — HOSPITAL ENCOUNTER (OUTPATIENT)
Facility: HOSPITAL | Age: 65
Setting detail: HOSPITAL OUTPATIENT SURGERY
Discharge: HOME | End: 2020-07-17
Attending: INTERNAL MEDICINE | Admitting: INTERNAL MEDICINE

## 2020-07-17 ENCOUNTER — ANESTHESIA EVENT (OUTPATIENT)
Dept: GASTROENTEROLOGY | Facility: HOSPITAL | Age: 65
End: 2020-07-17

## 2020-07-17 VITALS
RESPIRATION RATE: 18 BRPM | SYSTOLIC BLOOD PRESSURE: 101 MMHG | WEIGHT: 133 LBS | HEART RATE: 81 BPM | BODY MASS INDEX: 22.71 KG/M2 | OXYGEN SATURATION: 98 % | HEIGHT: 64 IN | DIASTOLIC BLOOD PRESSURE: 61 MMHG | TEMPERATURE: 98.2 F

## 2020-07-17 PROCEDURE — 25010000002 PROPOFOL 10 MG/ML EMULSION: Performed by: NURSE ANESTHETIST, CERTIFIED REGISTERED

## 2020-07-17 PROCEDURE — G0105 COLORECTAL SCRN; HI RISK IND: HCPCS | Performed by: INTERNAL MEDICINE

## 2020-07-17 RX ORDER — PROPOFOL 10 MG/ML
VIAL (ML) INTRAVENOUS AS NEEDED
Status: DISCONTINUED | OUTPATIENT
Start: 2020-07-17 | End: 2020-07-17 | Stop reason: SURG

## 2020-07-17 RX ORDER — LIDOCAINE HYDROCHLORIDE 10 MG/ML
0.5 INJECTION, SOLUTION EPIDURAL; INFILTRATION; INTRACAUDAL; PERINEURAL ONCE AS NEEDED
Status: DISCONTINUED | OUTPATIENT
Start: 2020-07-17 | End: 2020-07-17 | Stop reason: HOSPADM

## 2020-07-17 RX ORDER — SODIUM CHLORIDE 0.9 % (FLUSH) 0.9 %
10 SYRINGE (ML) INJECTION AS NEEDED
Status: DISCONTINUED | OUTPATIENT
Start: 2020-07-17 | End: 2020-07-17 | Stop reason: HOSPADM

## 2020-07-17 RX ORDER — SODIUM CHLORIDE 9 MG/ML
500 INJECTION, SOLUTION INTRAVENOUS CONTINUOUS PRN
Status: DISCONTINUED | OUTPATIENT
Start: 2020-07-17 | End: 2020-07-17 | Stop reason: HOSPADM

## 2020-07-17 RX ADMIN — PROPOFOL 420 MG: 10 INJECTION, EMULSION INTRAVENOUS at 10:48

## 2020-07-17 RX ADMIN — LIDOCAINE HYDROCHLORIDE 100 MG: 20 INJECTION, SOLUTION INTRAVENOUS at 10:48

## 2020-07-17 RX ADMIN — SODIUM CHLORIDE 500 ML: 9 INJECTION, SOLUTION INTRAVENOUS at 09:28

## 2020-07-17 NOTE — ANESTHESIA POSTPROCEDURE EVALUATION
Patient: Jenni Anh    Procedure Summary     Date:  07/17/20 Room / Location:  [facility] ENDOSCOPY 6 / [facility] ENDOSCOPY    Anesthesia Start:  1041 Anesthesia Stop:  1106    Procedure:  COLONOSCOPY WITH ANESTHESIA (N/A ) Diagnosis:       Colon cancer screening      Family history of polyps in the colon      (Colon cancer screening [Z12.11])      (Family history of polyps in the colon [Z83.71])    Surgeon:  Geraldine Gonzalez MD Provider:  Maryann Villalta CRNA    Anesthesia Type:  MAC ASA Status:  2          Anesthesia Type: MAC    Vitals  Vitals Value Taken Time   /61 7/17/2020 11:25 AM   Temp     Pulse 81 7/17/2020 11:25 AM   Resp 18 7/17/2020 11:25 AM   SpO2 98 % 7/17/2020 11:25 AM   Vitals shown include unvalidated device data.        Post Anesthesia Care and Evaluation    Patient location during evaluation: PHASE II  Patient participation: complete - patient participated  Level of consciousness: awake and alert  Pain management: adequate  Airway patency: patent  Anesthetic complications: No anesthetic complications  PONV Status: none  Cardiovascular status: acceptable  Respiratory status: acceptable  Hydration status: acceptable  No anesthesia care post op

## 2020-07-17 NOTE — ANESTHESIA PREPROCEDURE EVALUATION
Anesthesia Evaluation     Patient summary reviewed and Nursing notes reviewed   no history of anesthetic complications:  NPO Solid Status: > 8 hours  NPO Liquid Status: > 2 hours           Airway   Mallampati: I  TM distance: >3 FB  Neck ROM: full  No difficulty expected  Dental      Pulmonary    Cardiovascular   Exercise tolerance: good (4-7 METS)    (+) hyperlipidemia,       Neuro/Psych  (+) psychiatric history Anxiety and Depression,     GI/Hepatic/Renal/Endo    (+)  GERD,  renal disease stones,     Musculoskeletal     Abdominal    Substance History      OB/GYN          Other                        Anesthesia Plan    ASA 2     MAC     intravenous induction     Anesthetic plan, all risks, benefits, and alternatives have been provided, discussed and informed consent has been obtained with: patient.

## 2020-08-10 ENCOUNTER — HOSPITAL ENCOUNTER (EMERGENCY)
Facility: HOSPITAL | Age: 65
Discharge: HOME | End: 2020-08-11
Attending: EMERGENCY MEDICINE | Admitting: EMERGENCY MEDICINE

## 2020-08-10 DIAGNOSIS — R11.2 NON-INTRACTABLE VOMITING WITH NAUSEA, UNSPECIFIED VOMITING TYPE: Primary | ICD-10-CM

## 2020-08-10 DIAGNOSIS — R19.7 DIARRHEA, UNSPECIFIED TYPE: ICD-10-CM

## 2020-08-10 DIAGNOSIS — R74.01 TRANSAMINITIS: ICD-10-CM

## 2020-08-10 LAB
ALBUMIN SERPL-MCNC: 4.7 G/DL (ref 3.5–5.2)
ALBUMIN/GLOB SERPL: 1.6 G/DL
ALP SERPL-CCNC: 132 U/L (ref 39–117)
ALT SERPL W P-5'-P-CCNC: 40 U/L (ref 1–33)
ANION GAP SERPL CALCULATED.3IONS-SCNC: 18 MMOL/L (ref 5–15)
AST SERPL-CCNC: 46 U/L (ref 1–32)
BASOPHILS # BLD AUTO: 0.03 10*3/MM3 (ref 0–0.2)
BASOPHILS NFR BLD AUTO: 0.3 % (ref 0–1.5)
BILIRUB SERPL-MCNC: 0.4 MG/DL (ref 0–1.2)
BUN SERPL-MCNC: 23 MG/DL (ref 8–23)
BUN/CREAT SERPL: 24.2 (ref 7–25)
CALCIUM SPEC-SCNC: 10.6 MG/DL (ref 8.6–10.5)
CHLORIDE SERPL-SCNC: 101 MMOL/L (ref 98–107)
CO2 SERPL-SCNC: 24 MMOL/L (ref 22–29)
CREAT SERPL-MCNC: 0.95 MG/DL (ref 0.57–1)
DEPRECATED RDW RBC AUTO: 39.8 FL (ref 37–54)
EOSINOPHIL # BLD AUTO: 0.08 10*3/MM3 (ref 0–0.4)
EOSINOPHIL NFR BLD AUTO: 0.8 % (ref 0.3–6.2)
ERYTHROCYTE [DISTWIDTH] IN BLOOD BY AUTOMATED COUNT: 12.9 % (ref 12.3–15.4)
GFR SERPL CREATININE-BSD FRML MDRD: 59 ML/MIN/1.73
GLOBULIN UR ELPH-MCNC: 3 GM/DL
GLUCOSE SERPL-MCNC: 165 MG/DL (ref 65–99)
HCT VFR BLD AUTO: 49.5 % (ref 34–46.6)
HGB BLD-MCNC: 16.5 G/DL (ref 12–15.9)
IMM GRANULOCYTES # BLD AUTO: 0.03 10*3/MM3 (ref 0–0.05)
IMM GRANULOCYTES NFR BLD AUTO: 0.3 % (ref 0–0.5)
LIPASE SERPL-CCNC: 202 U/L (ref 13–60)
LYMPHOCYTES # BLD AUTO: 5.13 10*3/MM3 (ref 0.7–3.1)
LYMPHOCYTES NFR BLD AUTO: 49.6 % (ref 19.6–45.3)
MCH RBC QN AUTO: 28.4 PG (ref 26.6–33)
MCHC RBC AUTO-ENTMCNC: 33.3 G/DL (ref 31.5–35.7)
MCV RBC AUTO: 85.3 FL (ref 79–97)
MONOCYTES # BLD AUTO: 0.41 10*3/MM3 (ref 0.1–0.9)
MONOCYTES NFR BLD AUTO: 4 % (ref 5–12)
NEUTROPHILS NFR BLD AUTO: 4.67 10*3/MM3 (ref 1.7–7)
NEUTROPHILS NFR BLD AUTO: 45 % (ref 42.7–76)
NRBC BLD AUTO-RTO: 0 /100 WBC (ref 0–0.2)
PLATELET # BLD AUTO: 371 10*3/MM3 (ref 140–450)
PMV BLD AUTO: 11.2 FL (ref 6–12)
POTASSIUM SERPL-SCNC: 3.6 MMOL/L (ref 3.5–5.2)
PROT SERPL-MCNC: 7.7 G/DL (ref 6–8.5)
RBC # BLD AUTO: 5.8 10*6/MM3 (ref 3.77–5.28)
SODIUM SERPL-SCNC: 143 MMOL/L (ref 136–145)
WBC # BLD AUTO: 10.35 10*3/MM3 (ref 3.4–10.8)

## 2020-08-10 PROCEDURE — 80053 COMPREHEN METABOLIC PANEL: CPT

## 2020-08-10 PROCEDURE — 96374 THER/PROPH/DIAG INJ IV PUSH: CPT

## 2020-08-10 PROCEDURE — 85025 COMPLETE CBC W/AUTO DIFF WBC: CPT

## 2020-08-10 PROCEDURE — 96375 TX/PRO/DX INJ NEW DRUG ADDON: CPT

## 2020-08-10 PROCEDURE — 99284 EMERGENCY DEPT VISIT MOD MDM: CPT

## 2020-08-10 PROCEDURE — 83690 ASSAY OF LIPASE: CPT

## 2020-08-10 RX ORDER — SODIUM CHLORIDE 0.9 % (FLUSH) 0.9 %
10 SYRINGE (ML) INJECTION AS NEEDED
Status: DISCONTINUED | OUTPATIENT
Start: 2020-08-10 | End: 2020-08-11 | Stop reason: HOSPADM

## 2020-08-11 ENCOUNTER — APPOINTMENT (OUTPATIENT)
Dept: CT IMAGING | Facility: HOSPITAL | Age: 65
End: 2020-08-11

## 2020-08-11 VITALS
BODY MASS INDEX: 26.46 KG/M2 | OXYGEN SATURATION: 99 % | HEIGHT: 64 IN | TEMPERATURE: 97.9 F | RESPIRATION RATE: 18 BRPM | WEIGHT: 155 LBS | HEART RATE: 81 BPM | DIASTOLIC BLOOD PRESSURE: 71 MMHG | SYSTOLIC BLOOD PRESSURE: 111 MMHG

## 2020-08-11 LAB
HOLD SPECIMEN: NORMAL
HOLD SPECIMEN: NORMAL
WHOLE BLOOD HOLD SPECIMEN: NORMAL
WHOLE BLOOD HOLD SPECIMEN: NORMAL

## 2020-08-11 PROCEDURE — 96375 TX/PRO/DX INJ NEW DRUG ADDON: CPT

## 2020-08-11 PROCEDURE — 25010000002 IOPAMIDOL 61 % SOLUTION: Performed by: EMERGENCY MEDICINE

## 2020-08-11 PROCEDURE — 25010000002 ONDANSETRON PER 1 MG: Performed by: EMERGENCY MEDICINE

## 2020-08-11 PROCEDURE — 25010000002 MORPHINE PER 10 MG: Performed by: EMERGENCY MEDICINE

## 2020-08-11 PROCEDURE — 74177 CT ABD & PELVIS W/CONTRAST: CPT

## 2020-08-11 PROCEDURE — 93005 ELECTROCARDIOGRAM TRACING: CPT | Performed by: EMERGENCY MEDICINE

## 2020-08-11 PROCEDURE — 96374 THER/PROPH/DIAG INJ IV PUSH: CPT

## 2020-08-11 PROCEDURE — 93010 ELECTROCARDIOGRAM REPORT: CPT | Performed by: INTERNAL MEDICINE

## 2020-08-11 RX ORDER — ONDANSETRON 4 MG/1
4 TABLET, ORALLY DISINTEGRATING ORAL 4 TIMES DAILY PRN
Qty: 15 TABLET | Refills: 0 | Status: SHIPPED | OUTPATIENT
Start: 2020-08-11 | End: 2021-10-08

## 2020-08-11 RX ORDER — MORPHINE SULFATE 10 MG/ML
6 INJECTION INTRAMUSCULAR; INTRAVENOUS; SUBCUTANEOUS ONCE
Status: COMPLETED | OUTPATIENT
Start: 2020-08-11 | End: 2020-08-11

## 2020-08-11 RX ORDER — ONDANSETRON 2 MG/ML
4 INJECTION INTRAMUSCULAR; INTRAVENOUS ONCE
Status: COMPLETED | OUTPATIENT
Start: 2020-08-11 | End: 2020-08-11

## 2020-08-11 RX ORDER — DICYCLOMINE HCL 20 MG
20 TABLET ORAL EVERY 6 HOURS PRN
Qty: 15 TABLET | Refills: 0 | Status: SHIPPED | OUTPATIENT
Start: 2020-08-11 | End: 2021-10-08

## 2020-08-11 RX ADMIN — SODIUM CHLORIDE 1000 ML: 9 INJECTION, SOLUTION INTRAVENOUS at 00:36

## 2020-08-11 RX ADMIN — ONDANSETRON HYDROCHLORIDE 4 MG: 2 SOLUTION INTRAMUSCULAR; INTRAVENOUS at 00:36

## 2020-08-11 RX ADMIN — MORPHINE SULFATE 6 MG: 10 INJECTION, SOLUTION INTRAMUSCULAR; INTRAVENOUS at 00:36

## 2020-08-11 RX ADMIN — IOPAMIDOL 100 ML: 612 INJECTION, SOLUTION INTRAVENOUS at 01:07

## 2020-08-11 NOTE — ED PROVIDER NOTES
Subjective   65 y/o female arrives for evaluation of nausea, vomiting, diarrhea for the last few hours with noted diffuse abdominal cramping. She denies ill contacts, abx usage, trips, travels, unusual ingestion, camping trips or other issues. She arrives in [facility].         Family, social and past history reviewed as below, prior documentation of H and Ps and other documentation are reviewed:    Past Medical History:  No date: Allergic  No date: Anxiety  No date: Depression  No date: Elevated cholesterol  No date: Family history of colonic polyps  No date: GERD (gastroesophageal reflux disease)  No date: IBS (irritable bowel syndrome)  No date: Kidney stone  No date: PONV (postoperative nausea and vomiting)  No date: Sinusitis  No date: Skin cancer  No date: Urinary tract infection    Past Surgical History:  No date: APPENDECTOMY  07/29/2015: BREAUX PROCEDURE  05/14/2015: COLONOSCOPY      Comment:  Normal; Repeat 5 years  08/17/2007: COLONOSCOPY      Comment:  Normal; Repeat 7 years  7/17/2020: COLONOSCOPY; N/A      Comment:  Procedure: COLONOSCOPY WITH ANESTHESIA;  Surgeon: Geraldine Gonzalez MD;  Location: [facility] ENDOSCOPY;  Service:                Gastroenterology;  Laterality: N/A;  preop; screening                postopPCP Sebastián Batista   No date: DIAGNOSTIC LAPAROSCOPY EXPLORATORY LAPAROTOMY      Comment:  For endometriosis  07/29/2015: ENDOSCOPY      Comment:  Medium-sized HH; Normal stomach; Normal examined                duodenum; BRAVO pH capsule deployed; No specimens                collected  05/14/2015: ENDOSCOPY      Comment:  HH; Schatzki's ring-dilated to 20mm; LA grade B                esophagitis  No date: HYSTERECTOMY  No date: TONSILLECTOMY    Social History    Socioeconomic History      Marital status:       Spouse name: Not on file      Number of children: Not on file      Years of education: Not on file      Highest education level: Not on file    Tobacco Use      Smoking status:  Never Smoker      Smokeless tobacco: Never Used    Substance and Sexual Activity      Alcohol use: Yes        Comment: Rarely      Drug use: Never      Sexual activity: Yes      Family history: reviewed and   noncontributory           Review of Systems   All other systems reviewed and are negative.      Past Medical History:   Diagnosis Date   • Allergic    • Anxiety    • Depression    • Elevated cholesterol    • Family history of colonic polyps    • GERD (gastroesophageal reflux disease)    • IBS (irritable bowel syndrome)    • Kidney stone    • PONV (postoperative nausea and vomiting)    • Sinusitis    • Skin cancer    • Urinary tract infection        No Known Allergies    Past Surgical History:   Procedure Laterality Date   • APPENDECTOMY     • BREAUX PROCEDURE  07/29/2015   • COLONOSCOPY  05/14/2015    Normal; Repeat 5 years   • COLONOSCOPY  08/17/2007    Normal; Repeat 7 years   • COLONOSCOPY N/A 7/17/2020    Procedure: COLONOSCOPY WITH ANESTHESIA;  Surgeon: Geraldine Gonzalez MD;  Location: [facility] ENDOSCOPY;  Service: Gastroenterology;  Laterality: N/A;  preop; screening   postop  PCP Sebastián Batista    • DIAGNOSTIC LAPAROSCOPY EXPLORATORY LAPAROTOMY      For endometriosis   • ENDOSCOPY  07/29/2015    Medium-sized HH; Normal stomach; Normal examined duodenum; BRAVO pH capsule deployed; No specimens collected   • ENDOSCOPY  05/14/2015    HH; Schatzki's ring-dilated to 20mm; LA grade B esophagitis   • HYSTERECTOMY     • TONSILLECTOMY         Family History   Problem Relation Age of Onset   • Breast cancer Mother    • Colon polyps Mother         unk age--she has passed away   • Stroke Father    • Colon cancer Neg Hx    • Esophageal cancer Neg Hx    • Liver cancer Neg Hx    • Liver disease Neg Hx    • Rectal cancer Neg Hx    • Stomach cancer Neg Hx        Social History     Socioeconomic History   • Marital status:      Spouse name: Not on file   • Number of children: Not on file   • Years of education: Not on  file   • Highest education level: Not on file   Tobacco Use   • Smoking status: Never Smoker   • Smokeless tobacco: Never Used   Substance and Sexual Activity   • Alcohol use: Yes     Comment: Rarely   • Drug use: Never   • Sexual activity: Yes           Objective   Physical Exam   Constitutional: She is oriented to person, place, and time. She appears well-developed and well-nourished.   HENT:   Head: Normocephalic and atraumatic.   Eyes: Pupils are equal, round, and reactive to light. Conjunctivae and EOM are normal.   Neck: Normal range of motion. Neck supple.   Cardiovascular: Regular rhythm, normal heart sounds and intact distal pulses. Tachycardia present.   Pulmonary/Chest: Effort normal and breath sounds normal.   Abdominal: Soft. Bowel sounds are normal. She exhibits no distension and no mass. There is generalized tenderness. There is no rebound and no guarding. No hernia.   Musculoskeletal: Normal range of motion.   Neurological: She is alert and oriented to person, place, and time.   Skin: Skin is warm. Capillary refill takes less than 2 seconds.   Psychiatric: She has a normal mood and affect. Her behavior is normal.   Vitals reviewed.      Procedures           ED Course  ED Course as of Aug 11 0259   Tue Aug 11, 2020   0155 States she feels improved will do po challenge.     [JH]   0255 CT showed entertiis and nospecitif gastritis.         Patient was able to tolerate po withou tissue and notes she feels much improved at ths time. No further vomiting nor diarrhea. She tells me she has episodes similar to this about once per year and does actually follow up with Dr. Gonzalez with normal colonsocpy around 3 weeks ago. I did explain that I wished her to follow up with Dr. Gonzalez given her slight tansamanitis. Will place on brat diet and encourage follow up and return.     []      ED Course User Index  [] Cachorro King MD            CT Abdomen Pelvis With Contrast    (Results Pending)     Labs  Reviewed   COMPREHENSIVE METABOLIC PANEL - Abnormal; Notable for the following components:       Result Value    Glucose 165 (*)     Calcium 10.6 (*)     ALT (SGPT) 40 (*)     AST (SGOT) 46 (*)     Alkaline Phosphatase 132 (*)     eGFR Non  Amer 59 (*)     Anion Gap 18.0 (*)     All other components within normal limits    Narrative:     GFR Normal >60  Chronic Kidney Disease <60  Kidney Failure <15     LIPASE - Abnormal; Notable for the following components:    Lipase 202 (*)     All other components within normal limits   CBC WITH AUTO DIFFERENTIAL - Abnormal; Notable for the following components:    RBC 5.80 (*)     Hemoglobin 16.5 (*)     Hematocrit 49.5 (*)     Lymphocyte % 49.6 (*)     Monocyte % 4.0 (*)     Lymphocytes, Absolute 5.13 (*)     All other components within normal limits   RAINBOW DRAW    Narrative:     The following orders were created for panel order [place] Draw.  Procedure                               Abnormality         Status                     ---------                               -----------         ------                     Light Blue Top[[id number]]                                   Final result               Green Top (Gel)[[id number]]                                  Final result               Lavender Top[[id number]]                                     Final result               Red Top[[id number]]                                          Final result                 Please view results for these tests on the individual orders.   LIGHT BLUE TOP   GREEN TOP   LAVENDER TOP   RED TOP   CBC AND DIFFERENTIAL    Narrative:     The following orders were created for panel order CBC & Differential.  Procedure                               Abnormality         Status                     ---------                               -----------         ------                     CBC Auto Differential[[id number]]        Abnormal            Final result                 Please view results for  these tests on the individual orders.                                       MDM    Final diagnoses:   Non-intractable vomiting with nausea, unspecified vomiting type   Diarrhea, unspecified type   Transaminitis            Cachorro King MD  08/11/20 5063

## 2020-08-11 NOTE — DISCHARGE INSTRUCTIONS
Jenni,    I am glad you feel better.   As we talked about, your liver enzymes are slightly elevated today. While this is likely from your vomiting, I do wish that you get these repeated to assure they are improving. Please try the BRAT diet for the next 12-24 hours and advance your diet as tolerated after this. Please be sure to return for return of symptoms or any other issues.

## 2020-08-12 ENCOUNTER — NURSE TRIAGE (OUTPATIENT)
Dept: CALL CENTER | Facility: HOSPITAL | Age: 65
End: 2020-08-12

## 2020-08-12 NOTE — TELEPHONE ENCOUNTER
"Seen at the ER 08/10/2020  Works at [employer]. Her boss told her to stay home today. He was asking if COVID testing was needed.. She has/had no symptoms of COVID. Explained since she had no symptoms the ER  MD did not feel the testing was needed.    Reason for Disposition  • General information question, no triage required and triager able to answer question    Additional Information  • Negative: [1] Caller is not with the adult (patient) AND [2] reporting urgent symptoms  • Negative: Lab result questions  • Negative: Medication questions  • Negative: Caller can't be reached by phone  • Negative: Caller has already spoken to PCP or another triager  • Negative: RN needs further essential information from caller in order to complete triage  • Negative: Requesting regular office appointment  • Negative: [1] Caller requesting NON-URGENT health information AND [2] PCP's office is the best resource  • Negative: Health Information question, no triage required and triager able to answer question    Answer Assessment - Initial Assessment Questions  1. REASON FOR CALL or QUESTION: \"What is your reason for calling today?\" or \"How can I best help you?\" or \"What question do you have that I can help answer?\"  See note    Protocols used: INFORMATION ONLY CALL - NO TRIAGE-ADULT-      "

## 2020-08-17 NOTE — ED NOTES
"ED Call Back Questions    1. How are you doing since leaving the Emergency Department?    Much better    2. Do you have any questions about your discharge instructions? No     3. Have you filled your new prescriptions yet? Yes   a. Do you have any questions about those medications? No     4. Were you able to make a follow-up appointment with the physician? Yes     5. Do you have a primary care physician? Yes   a. If No, would you like for me to set you up with one? N/A  i. If Yes, “I will have our ED  give you a call right back at this number to work with you on the best time for an appointment.”    6. We are always looking to get better at what we do. Do you have any suggestions for what we can do to be even better? No   a. If Yes, \"Thank you for sharing your concerns. I apologize. I will follow up with our manager and patient . Would you like someone to call you back?\" N/A    7. Is there anything else I can do for you? No    Pt states that care was great!       Alessandra Correa, RN  08/17/20 4736    "

## 2021-03-11 ENCOUNTER — BULK ORDERING (OUTPATIENT)
Dept: CASE MANAGEMENT | Facility: OTHER | Age: 66
End: 2021-03-11

## 2021-03-11 DIAGNOSIS — Z23 IMMUNIZATION DUE: ICD-10-CM

## 2021-10-08 ENCOUNTER — OFFICE VISIT (OUTPATIENT)
Dept: OBSTETRICS AND GYNECOLOGY | Facility: CLINIC | Age: 66
End: 2021-10-08

## 2021-10-08 VITALS
SYSTOLIC BLOOD PRESSURE: 108 MMHG | HEIGHT: 65 IN | BODY MASS INDEX: 22.49 KG/M2 | DIASTOLIC BLOOD PRESSURE: 82 MMHG | WEIGHT: 135 LBS

## 2021-10-08 DIAGNOSIS — Z01.419 WELL WOMAN EXAM WITH ROUTINE GYNECOLOGICAL EXAM: Primary | ICD-10-CM

## 2021-10-08 DIAGNOSIS — Z12.39 ENCOUNTER FOR SCREENING FOR MALIGNANT NEOPLASM OF BREAST, UNSPECIFIED SCREENING MODALITY: ICD-10-CM

## 2021-10-08 DIAGNOSIS — M85.80 OSTEOPENIA, SENILE: ICD-10-CM

## 2021-10-08 PROCEDURE — 99397 PER PM REEVAL EST PAT 65+ YR: CPT | Performed by: ADVANCED PRACTICE MIDWIFE

## 2021-10-08 RX ORDER — BUSPIRONE HYDROCHLORIDE 7.5 MG/1
7.5 TABLET ORAL 3 TIMES DAILY
COMMUNITY

## 2021-10-08 RX ORDER — SIMVASTATIN 80 MG
TABLET ORAL
COMMUNITY
Start: 2021-09-20 | End: 2021-10-08

## 2021-10-08 NOTE — PATIENT INSTRUCTIONS
"BMI for Adults  What is BMI?  Body mass index (BMI) is a number that is calculated from a person's weight and height. BMI can help estimate how much of a person's weight is composed of fat. BMI does not measure body fat directly. Rather, it is an alternative to procedures that directly measure body fat, which can be difficult and expensive.  BMI can help identify people who may be at higher risk for certain medical problems.  What are BMI measurements used for?  BMI is used as a screening tool to identify possible weight problems. It helps determine whether a person is obese, overweight, a healthy weight, or underweight.  BMI is useful for:  · Identifying a weight problem that may be related to a medical condition or may increase the risk for medical problems.  · Promoting changes, such as changes in diet and exercise, to help reach a healthy weight. BMI screening can be repeated to see if these changes are working.  How is BMI calculated?  BMI involves measuring your weight in relation to your height. Both height and weight are measured, and the BMI is calculated from those numbers. This can be done either in English (U.S[place] or metric measurements. Note that charts and online BMI calculators are available to help you find your BMI quickly and easily without having to do these calculations yourself.  To calculate your BMI in English (U.S[place] measurements:    1. Measure your weight in pounds (lb).  2. Multiply the number of pounds by 703.  ? For example, for a person who weighs 180 lb, multiply that number by 703, which equals 126,540.  3. Measure your height in inches. Then multiply that number by itself to get a measurement called \"inches squared.\"  ? For example, for a person who is 70 inches tall, the \"inches squared\" measurement is 70 inches x 70 inches, which equals 4,900 inches squared.  4. Divide the total from step 2 (number of lb x 703) by the total from step 3 (inches squared): 126,540 ÷ 4,900 = 25.8. This is " "your BMI.    To calculate your BMI in metric measurements:  1. Measure your weight in kilograms (kg).  2. Measure your height in meters (m). Then multiply that number by itself to get a measurement called \"meters squared.\"  ? For example, for a person who is 1.75 m tall, the \"meters squared\" measurement is 1.75 m x 1.75 m, which is equal to 3.1 meters squared.  3. Divide the number of kilograms (your weight) by the meters squared number. In this example: 70 ÷ 3.1 = 22.6. This is your BMI.  What do the results mean?  BMI charts are used to identify whether you are underweight, normal weight, overweight, or obese. The following guidelines will be used:  · Underweight: BMI less than 18.5.  · Normal weight: BMI between 18.5 and 24.9.  · Overweight: BMI between 25 and 29.9.  · Obese: BMI of 30 or above.  Keep these notes in mind:  · Weight includes both fat and muscle, so someone with a muscular build, such as an athlete, may have a BMI that is higher than 24.9. In cases like these, BMI is not an accurate measure of body fat.  · To determine if excess body fat is the cause of a BMI of 25 or higher, further assessments may need to be done by a health care provider.  · BMI is usually interpreted in the same way for men and women.  Where to find more information  For more information about BMI, including tools to quickly calculate your BMI, go to these websites:  · Centers for Disease Control and Prevention: www.cdc.gov  · American Heart Association: www.heart.org  · National Heart, Lung, and Blood [place]: www.nhlbi.nih.gov  Summary  · Body mass index (BMI) is a number that is calculated from a person's weight and height.  · BMI may help estimate how much of a person's weight is composed of fat. BMI can help identify those who may be at higher risk for certain medical problems.  · BMI can be measured using English measurements or metric measurements.  · BMI charts are used to identify whether you are underweight, normal " weight, overweight, or obese.  This information is not intended to replace advice given to you by your health care provider. Make sure you discuss any questions you have with your health care provider.  Document Revised: 09/09/2020 Document Reviewed: 07/17/2020  Elsevier Patient Education © 2021 [employer]

## 2021-10-08 NOTE — PROGRESS NOTES
"Subjective     Jenni Ahn is a 66 y.o. female    Patient arrived to establish care with a well woman exam. She denies a hx of abnormal pap smears.  She denies active GYN related concerns.  Discussed GI related concerns with constipation and cholesterol, but she will also follow-up on these with her PCP.              /82   Ht 163.8 cm (64.5\")   Wt 61.2 kg (135 lb)   BMI 22.81 kg/m²     Outpatient Encounter Medications as of 10/8/2021   Medication Sig Dispense Refill   • busPIRone (BUSPAR) 7.5 MG tablet Take 7.5 mg by mouth 3 (Three) Times a Day. As needed     • desvenlafaxine (PRISTIQ) 100 MG 24 hr tablet Take 100 mg by mouth Daily.     • Ergocalciferol (VITAMIN D2 PO) Take 1.25 mg by mouth 1 (One) Time Per Week.     • esomeprazole (nexIUM) 40 MG capsule Take 1 capsule by mouth every morning (before breakfast)     • estradiol (ESTRACE) 1 MG tablet Take 0.5 mg by mouth Daily.     • gabapentin (NEURONTIN) 300 MG capsule Take 300 mg by mouth 2 (Two) Times a Day.     • meloxicam (MOBIC) 7.5 MG tablet Take 7.5 mg by mouth Daily.     • [DISCONTINUED] dicyclomine (BENTYL) 20 MG tablet Take 1 tablet by mouth Every 6 (Six) Hours As Needed (cramping). 15 tablet 0   • [DISCONTINUED] ondansetron ODT (ZOFRAN-ODT) 4 MG disintegrating tablet Place 1 tablet on the tongue 4 (Four) Times a Day As Needed for Nausea or Vomiting. 15 tablet 0   • [DISCONTINUED] rosuvastatin (CRESTOR) 5 MG tablet Take 5 mg by mouth 3 (Three) Times a Week.     • [DISCONTINUED] simvastatin (ZOCOR) 80 MG tablet Patient cannot tolerate this       No facility-administered encounter medications on file as of 10/8/2021.       Surgical History  Past Surgical History:   Procedure Laterality Date   • APPENDECTOMY     • BREAUX PROCEDURE  07/29/2015   • BREAST BIOPSY  2001, ?    x 2   • CATARACT EXTRACTION  2020   • COLONOSCOPY  05/14/2015    Normal; Repeat 5 years   • COLONOSCOPY  08/17/2007    Normal; Repeat 7 years   • COLONOSCOPY N/A 7/17/2020    " Procedure: COLONOSCOPY WITH ANESTHESIA;  Surgeon: Geraldine Gonzalez MD;  Location: [facility] ENDOSCOPY;  Service: Gastroenterology;  Laterality: N/A;  preop; screening   postop  PCP Sebastián Batista    • DIAGNOSTIC LAPAROSCOPY EXPLORATORY LAPAROTOMY      For endometriosis   • ENDOSCOPY  07/29/2015    Medium-sized HH; Normal stomach; Normal examined duodenum; BRAVO pH capsule deployed; No specimens collected   • ENDOSCOPY  05/14/2015    HH; Schatzki's ring-dilated to 20mm; LA grade B esophagitis   • HYSTERECTOMY     • TONSILLECTOMY     • TOTAL ABDOMINAL HYSTERECTOMY WITH SALPINGO OOPHORECTOMY  1998    Dr. Aldana   • WISDOM TOOTH EXTRACTION  [id number]       Family History  Family History   Problem Relation Age of Onset   • Breast cancer Mother    • Colon polyps Mother         unk age--she has passed away   • Hypertension Mother    • Stroke Father    • Prostate cancer Brother    • Diabetes Brother    • Stroke Paternal Grandfather         I think   • Colon cancer Neg Hx    • Esophageal cancer Neg Hx    • Liver cancer Neg Hx    • Liver disease Neg Hx    • Rectal cancer Neg Hx    • Stomach cancer Neg Hx        The following portions of the patient's history were reviewed and updated as appropriate: allergies, current medications, past family history, past medical history, past social history, past surgical history and problem list.    Review of Systems   Constitutional: Negative for activity change, appetite change, chills, diaphoresis, fatigue, fever, unexpected weight gain and unexpected weight loss.   HENT: Negative for congestion, dental problem, postnasal drip, rhinorrhea, sinus pressure, sneezing, sore throat, swollen glands, tinnitus, trouble swallowing and voice change.    Eyes: Negative for blurred vision and visual disturbance.   Respiratory: Negative for cough, choking, chest tightness and shortness of breath.    Cardiovascular: Negative for chest pain, palpitations and leg swelling.   Gastrointestinal: Positive for  constipation. Negative for abdominal distention, abdominal pain, blood in stool, diarrhea, nausea, rectal pain, vomiting, GERD and indigestion.        Utilizes Miralax to treat constipation but then after regular use of medication, she has episodes of urgency to have a loose bowel movement    Endocrine: Negative for cold intolerance and heat intolerance.   Genitourinary: Positive for amenorrhea, urgency and urinary incontinence. Negative for breast discharge, breast lump, breast pain, decreased libido, decreased urine volume, difficulty urinating, dyspareunia, dysuria, flank pain, frequency, genital sores, hematuria, pelvic pain, pelvic pressure, vaginal bleeding, vaginal discharge and vaginal pain.        Hysterectomy.    Musculoskeletal: Positive for arthralgias and back pain. Negative for gait problem, joint swelling, neck pain and neck stiffness.   Skin: Positive for dry skin. Negative for pallor, rash, skin lesions and bruise.   Allergic/Immunologic: Negative for environmental allergies, food allergies and immunocompromised state.   Neurological: Negative for dizziness, tremors, seizures, syncope, facial asymmetry, speech difficulty, weakness, light-headedness, numbness, headache, memory problem and confusion.   Hematological: Negative for adenopathy. Does not bruise/bleed easily.   Psychiatric/Behavioral: Positive for dysphoric mood and depressed mood. Negative for agitation, behavioral problems, decreased concentration, hallucinations, self-injury, sleep disturbance, suicidal ideas, negative for hyperactivity and stress. The patient is not nervous/anxious.         Takes medication for anxiety and depression.       Objective   Physical Exam  Vitals and nursing note reviewed. Exam conducted with a chaperone present.   Constitutional:       General: She is not in acute distress.     Appearance: Normal appearance. She is well-developed and normal weight. She is not ill-appearing or diaphoretic.   HENT:      Head:  Normocephalic and atraumatic.   Eyes:      General: Lids are normal.      Conjunctiva/sclera: Conjunctivae normal.   Neck:      Thyroid: No thyroid mass, thyromegaly or thyroid tenderness.      Vascular: No carotid bruit.      Trachea: Trachea and phonation normal.   Cardiovascular:      Rate and Rhythm: Normal rate and regular rhythm.      Heart sounds: Normal heart sounds. No murmur heard.      Pulmonary:      Effort: Pulmonary effort is normal. No respiratory distress.      Breath sounds: Normal breath sounds and air entry. No stridor. No wheezing, rhonchi or rales.   Chest:      Chest wall: No mass, lacerations, deformity, swelling, tenderness, crepitus or edema.   Breasts:      Right: No inverted nipple, mass, axillary adenopathy or supraclavicular adenopathy.      Left: No inverted nipple, mass, axillary adenopathy or supraclavicular adenopathy.       Abdominal:      General: A surgical scar is present. Bowel sounds are normal. There is no distension or abdominal bruit.      Palpations: Abdomen is soft. There is no mass.      Tenderness: There is no abdominal tenderness. There is no right CVA tenderness, left CVA tenderness, guarding or rebound.      Hernia: No hernia is present.   Genitourinary:     General: Normal vulva.      Exam position: Lithotomy position.      Pubic Area: No rash or pubic lice.       Labia:         Right: No rash, tenderness, lesion or injury.         Left: No rash, tenderness, lesion or injury.       Urethra: No prolapse, urethral pain, urethral swelling or urethral lesion.      Vagina: Normal. No signs of injury. No vaginal discharge, erythema, tenderness, bleeding or lesions.      Uterus: Absent.       Rectum: Normal. Normal anal tone.      Comments: Patient with hx of hysterectomy with BSO and prior hx of endometriosis, Vaginal cuff unremarkable. Labia normal, no lesions noted. Urethral meatus unremarkable, no prolapse of mucosa - utilizing hormone therapy. Anus/perineum  normal  Musculoskeletal:         General: Tenderness present. No swelling or signs of injury. Normal range of motion.      Cervical back: Normal range of motion and neck supple. No edema, erythema, rigidity or tenderness.      Right lower leg: No edema.      Left lower leg: No edema.   Lymphadenopathy:      Cervical: No cervical adenopathy.      Upper Body:      Right upper body: No supraclavicular, axillary or pectoral adenopathy.      Left upper body: No supraclavicular, axillary or pectoral adenopathy.   Skin:     General: Skin is warm and dry.      Coloration: Skin is not ashen, cyanotic, jaundiced or pale.      Findings: No abrasion, abscess, acne, bruising, burn, ecchymosis, erythema, signs of injury, laceration, lesion, petechiae, rash or wound.   Neurological:      Mental Status: She is alert and oriented to person, place, and time.      Motor: No weakness.      Gait: Gait normal.   Psychiatric:         Attention and Perception: Attention and perception normal.         Mood and Affect: Mood and affect normal.         Speech: Speech normal.         Behavior: Behavior normal. Behavior is cooperative.         Thought Content: Thought content normal.         Cognition and Memory: Cognition and memory normal.         Judgment: Judgment normal.       Colonoscopy: 07/2020 - normal - next due 2025   Mammogram: due per patient - hx of breast biopsy  Bone density scan - patient reports hx of osteopenia - pt believes it has been  at least 2 years and is due    Assessment/Plan   Diagnoses and all orders for this visit:    1. Well woman exam with routine gynecological exam (Primary)  - well woman exam completed.  Pt reports medication and labs through PCP. Discussed weight management and importance of maintaining a healthy weight. Discussed Daily exercise and the importance of same, in regards to a healthy heart as well as helping to maintain her weight and improving her mental health.  BMI 22.6. Colonoscopy is up to  date.    2. Osteopenia, senile  -  Discussed Vitamin D intake and the importance of adequate vitamin D for both Bone Health and a healthy immune system. Discussed calcium intake in diet and supplementation associated with bone health.  - Discussed weight bearing exercises  -     DEXA Bone Density Axial; Future ordered today for patient to complete    3. Encounter for screening for malignant neoplasm of breast, unspecified screening modality  - Encouraged SBE, pt is aware how to do self breast exam and the importance of same.   -     Mammo Screening Digital Tomosynthesis Bilateral With CAD; Future ordered today for patient to take.     Patient's Body mass index is 22.81 kg/m². indicating that she is within normal range (BMI 18.5-24.9). No BMI management plan needed..    RTO in 1 year for well woman exam or PRN with concerns.    Shanel Marcelino, APRN

## 2022-02-03 NOTE — PROGRESS NOTES
Chief Complaint   Patient presents with   • Colon Cancer Screening     Pt presents today for colon recall-last colon was 5/14/2015; Family history of colon polyps     Subjective   HPI  Jenni Ahn is a 64 y.o. female who presents as a referral for preventative maintenance. She has no complaints of nausea or vomiting. No change in bowels. No wt loss. No BRBPR. No melena. There is no family hx for colon cancer. No abdominal pain. There was no Cologuard screening this year.  Patient's last colonoscopy was performed on 5/14/2015 found to be normal.    Past Medical History:   Diagnosis Date   • Allergic    • Anxiety    • Depression    • Elevated cholesterol    • Family history of colonic polyps    • GERD (gastroesophageal reflux disease)    • IBS (irritable bowel syndrome)    • Kidney stone    • Sinusitis    • Skin cancer    • Urinary tract infection      Past Surgical History:   Procedure Laterality Date   • APPENDECTOMY     • BREAUX PROCEDURE  07/29/2015   • COLONOSCOPY  05/14/2015    Normal; Repeat 5 years   • COLONOSCOPY  08/17/2007    Normal; Repeat 7 years   • DIAGNOSTIC LAPAROSCOPY EXPLORATORY LAPAROTOMY      For endometriosis   • ENDOSCOPY  07/29/2015    Medium-sized HH; Normal stomach; Normal examined duodenum; BRAVO pH capsule deployed; No specimens collected   • ENDOSCOPY  05/14/2015    HH; Schatzki's ring-dilated to 20mm; LA grade B esophagitis   • HYSTERECTOMY     • TONSILLECTOMY         Current Outpatient Medications:   •  busPIRone (BUSPAR) 7.5 MG tablet, Take 7.5 mg by mouth As Needed., Disp: , Rfl:   •  desvenlafaxine (PRISTIQ) 100 MG 24 hr tablet, Take 100 mg by mouth Daily., Disp: , Rfl:   •  esomeprazole (nexIUM) 40 MG capsule, Take 1 capsule by mouth every morning (before breakfast), Disp: , Rfl:   •  estradiol (ESTRACE) 1 MG tablet, Take 0.5 mg by mouth Daily., Disp: , Rfl:   •  gabapentin (NEURONTIN) 300 MG capsule, Take 300 mg by mouth 2 (Two) Times a Day., Disp: , Rfl:   •  meloxicam (MOBIC) 7.5  "MG tablet, Take 7.5 mg by mouth Daily., Disp: , Rfl:   •  rosuvastatin (CRESTOR) 5 MG tablet, Take 5 mg by mouth 1 (One) Time Per Week., Disp: , Rfl:   •  sodium-potassium-magnesium sulfates (Suprep Bowel Prep Kit) 17.5-3.13-1.6 GM/177ML solution oral solution, Take 1 bottle by mouth Every 12 (Twelve) Hours. Split dose prep as directed by office instructions provided.  2 bottles = one kit., Disp: 2 bottle, Rfl: 0  No Known Allergies  Social History     Socioeconomic History   • Marital status: Legally      Spouse name: Not on file   • Number of children: Not on file   • Years of education: Not on file   • Highest education level: Not on file   Tobacco Use   • Smoking status: Never Smoker   • Smokeless tobacco: Never Used   Substance and Sexual Activity   • Alcohol use: Yes     Comment: Rarely   • Drug use: Never   • Sexual activity: Yes     Family History   Problem Relation Age of Onset   • Breast cancer Mother    • Colon polyps Mother    • Stroke Father    • Colon cancer Neg Hx    • Esophageal cancer Neg Hx    • Liver cancer Neg Hx    • Liver disease Neg Hx    • Rectal cancer Neg Hx    • Stomach cancer Neg Hx        REVIEW OF SYSTEMS  General: well appearing, no fever chills or sweats, no unexplained wt loss  HEENT: no acute visual or hearing disturbances  Cardiovascular: No chest pain or palpitations  Pulmonary: No shortness of breath, coughing, wheezing or hemoptysis  : No burning, urgency, hematuria, or dysuria  Musculoskeletal: No joint pain or stiffness  Peripheral: no edema  Skin: No lesions or rashes  Neuro: No dizziness, headaches, stroke, syncope  Endocrine: No hot or cold intolerances  Hematological: No blood dyscrasias    Objective   Vitals:    07/09/20 1424   BP: 128/76   BP Location: Left arm   Patient Position: Sitting   Cuff Size: Adult   Pulse: 90   Temp: 98.2 °F (36.8 °C)   TempSrc: Tympanic   SpO2: 99%   Weight: 60.8 kg (134 lb)   Height: 163.8 cm (64.5\")     Body mass index is 22.65 " kg/m².    PHYSICAL EXAM  General: age appropriate well nourished well appearing, no acute distress  Head: normocephalic and atraumatic  Global assessment-supple  Neck-No JVD noted, no lymphadenopathy  Pulmonary-clear to auscultation bilaterally, normal respiratory effort  Cardiovascular-normal rate and rhythm, normal heart sounds, S1 and S2 noted  Abdomen-soft, non tender, non distended, normal bowel sounds all 4 quadrants, no hepatosplenomegaly noted  Extremities-No clubbing cyanosis or edema  Neuro-Non focal, converses appropriately, awake, alert, oriented        Imaging Results (Most Recent)     None        Assessment/Plan   Jenni was seen today for colon cancer screening.    Diagnoses and all orders for this visit:    Colon cancer screening  -     Case Request; Standing  -     Case Request    Family history of polyps in the colon  Comments:  mother   Orders:  -     Case Request; Standing  -     Case Request    Other orders  -     Follow Anesthesia Guidelines / Protocol; Future  -     Obtain Informed Consent; Future  -     sodium-potassium-magnesium sulfates (Suprep Bowel Prep Kit) 17.5-3.13-1.6 GM/177ML solution oral solution; Take 1 bottle by mouth Every 12 (Twelve) Hours. Split dose prep as directed by office instructions provided.  2 bottles = one kit.      COLONOSCOPY WITH ANESTHESIA (N/A)       Body mass index is 22.65 kg/m². Patient's Body mass index is 22.65 kg/m². BMI is within normal parameters. No follow-up required..      All risks, benefits, alternatives, and indications of colonoscopy procedure have been discussed with the patient. Risks to include perforation of the colon requiring possible surgery or colostomy, risk of bleeding from biopsies or removal of colon tissue, possibility of missing a colon polyp or cancer, or adverse drug reaction.  Benefits to include the diagnosis and management of disease of the colon and rectum. Alternatives to include barium enema, radiographic evaluation, lab  testing or no intervention. Pt verbalizes understanding and agrees.    Nostril Rim Text: The closure involved the nostril rim.

## 2022-06-09 DIAGNOSIS — Z12.39 ENCOUNTER FOR SCREENING FOR MALIGNANT NEOPLASM OF BREAST, UNSPECIFIED SCREENING MODALITY: ICD-10-CM

## 2022-10-18 ENCOUNTER — TELEPHONE (OUTPATIENT)
Dept: OTOLARYNGOLOGY | Facility: CLINIC | Age: 67
End: 2022-10-18

## 2022-10-18 NOTE — TELEPHONE ENCOUNTER
I have left patient a message to call me back so we can get her in for evaluation of ear lesion. Waiting on call back.

## 2022-11-09 ENCOUNTER — OFFICE VISIT (OUTPATIENT)
Dept: OTOLARYNGOLOGY | Facility: CLINIC | Age: 67
End: 2022-11-09

## 2022-11-09 VITALS — HEART RATE: 78 BPM | TEMPERATURE: 98.4 F | DIASTOLIC BLOOD PRESSURE: 68 MMHG | SYSTOLIC BLOOD PRESSURE: 140 MMHG

## 2022-11-09 DIAGNOSIS — D48.5 NEOPLASM OF UNCERTAIN BEHAVIOR OF SKIN: ICD-10-CM

## 2022-11-09 DIAGNOSIS — J34.89: Primary | ICD-10-CM

## 2022-11-09 PROCEDURE — 11105 PUNCH BX SKIN EA SEP/ADDL: CPT | Performed by: OTOLARYNGOLOGY

## 2022-11-09 PROCEDURE — 11104 PUNCH BX SKIN SINGLE LESION: CPT | Performed by: OTOLARYNGOLOGY

## 2022-11-09 PROCEDURE — 88305 TISSUE EXAM BY PATHOLOGIST: CPT | Performed by: OTOLARYNGOLOGY

## 2022-11-09 RX ORDER — ALIROCUMAB 75 MG/ML
INJECTION, SOLUTION SUBCUTANEOUS
COMMUNITY
Start: 2022-11-08

## 2022-11-09 NOTE — PROGRESS NOTES
Preprocedure diagnosis: An ulceration of the right conchal bowl    Post procedure diagnosis: Same    Procedure: Punch biopsy    Details:  Patient consent was obtained.  The skin was cleansed with alcohol.  The skin was infiltrated with 1 mL of 1% lidocaine with 1-100,000 epinephrine.  After approximately 10 minutes, a 2 millimeter punch biopsy was taken by placing circular motion on the biopsy tool, the skin was elevated and clipped with iris scissors.  The specimen was sent in formalin.  The skin was closed using a simple 5-0 fast absorbing gut suture.  Antibiotic ointment was placed over the biopsy site.  The patient tolerated the procedure with minimal discomfort.    Elijah Rey MD  11/09/22  16:20 CST    Preprocedure diagnosis: nodule of the right nasal cavity    Post procedure diagnosis: Same    Procedure: Punch biopsy    Details:  Patient consent was obtained.  The skin was cleansed with alcohol.  The skin was infiltrated with 1 mL of 1% lidocaine with 1-100,000 epinephrine.  After approximately 10 minutes, a 2 millimeter punch biopsy was taken by placing circular motion on the biopsy tool, the skin was elevated and clipped with iris scissors.  The specimen was sent in formalin. Silver nitrate was applied. The patient tolerated the procedure with minimal discomfort.    Elijah Rey MD  11/09/22  16:20 CST

## 2022-11-09 NOTE — PROGRESS NOTES
PRIMARY CARE PROVIDER: Sebastián Batista DO  REFERRING PROVIDER: Sebastián Batista DO    Chief Complaint   Patient presents with   • Skin Lesion     Lesion to to right inner ear and also inside of right nostril       Subjective   History of Present Illness:  Jenni Ahn is a  67 y.o. female who presents for consultation regarding a skin lesion of the right conchal bowl.  The lesion has the following characteristics:    Location: Right ear  Quality: red, bleeding tender lesion that waxes and wanes  Severity: moderate  Duration: 1 year  Timing: constant  Modifying Factors: none  Associated Signs & Symptoms: bleeding and tenderness    Location: Right nostril  Quality: bleeding area on right  Severity: paraxysmal moderate  Duration: months  Timing: constant  Modifying Factors: Neosporin will help  Associated Signs & Symptoms: bleeding    Seeing Dr. Johnson tomorrow - has had multiple skin cancers before.    Review of Systems:  Review of Systems   Constitutional: Negative for chills, fatigue, fever and unexpected weight change.   HENT: Positive for ear pain and nosebleeds. Negative for facial swelling.    Respiratory: Negative for cough, chest tightness and shortness of breath.    Cardiovascular: Negative for chest pain.   Musculoskeletal: Negative for neck pain.   Skin: Negative for color change.   Neurological: Negative for facial asymmetry.   Hematological: Negative for adenopathy. Bruises/bleeds easily.       Past History:  Past Medical History:   Diagnosis Date   • Allergic    • Anxiety    • Depression    • Elevated cholesterol    • Endometriosis     had hysterectomy   • Family history of colonic polyps    • Female infertility got pregnant in 1984    had endometriosis   • GERD (gastroesophageal reflux disease)    • Herpes ?? or HPV ??    HPV or Herpes ??   • HPV (human papilloma virus) infection or Herpes ? approx. [id number]    (See  records) From  During 5 yr. marriage.   • IBS (irritable bowel syndrome)     • Kidney stone    • PONV (postoperative nausea and vomiting)    • Sinusitis    • Skin cancer    • Urinary tract infection    • Varicella ??     Past Surgical History:   Procedure Laterality Date   • APPENDECTOMY     • BREAUX PROCEDURE  07/29/2015   • BREAST BIOPSY  2001, ?    x 2   • CATARACT EXTRACTION  2020   • COLONOSCOPY  05/14/2015    Normal; Repeat 5 years   • COLONOSCOPY  08/17/2007    Normal; Repeat 7 years   • COLONOSCOPY N/A 7/17/2020    Procedure: COLONOSCOPY WITH ANESTHESIA;  Surgeon: Geraldine Gonzalez MD;  Location: [facility] ENDOSCOPY;  Service: Gastroenterology;  Laterality: N/A;  preop; screening   postop  PCP Sebastián Batista    • DIAGNOSTIC LAPAROSCOPY EXPLORATORY LAPAROTOMY      For endometriosis   • ENDOSCOPY  07/29/2015    Medium-sized HH; Normal stomach; Normal examined duodenum; BRAVO pH capsule deployed; No specimens collected   • ENDOSCOPY  05/14/2015    HH; Schatzki's ring-dilated to 20mm; LA grade B esophagitis   • HYSTERECTOMY     • TONSILLECTOMY     • TOTAL ABDOMINAL HYSTERECTOMY WITH SALPINGO OOPHORECTOMY  1998    Dr. Aldana   • WISDOM TOOTH EXTRACTION  [id number]     Family History   Problem Relation Age of Onset   • Breast cancer Mother    • Colon polyps Mother         unk age--she has passed away   • Hypertension Mother    • Stroke Father    • Prostate cancer Brother    • Diabetes Brother    • Stroke Paternal Grandfather         I think   • Colon cancer Neg Hx    • Esophageal cancer Neg Hx    • Liver cancer Neg Hx    • Liver disease Neg Hx    • Rectal cancer Neg Hx    • Stomach cancer Neg Hx      Social History     Tobacco Use   • Smoking status: Never   • Smokeless tobacco: Never   Substance Use Topics   • Alcohol use: Yes     Alcohol/week: 0.0 standard drinks     Comment: Mixed drink once in a while. Not even once a month.   • Drug use: Never     Allergies:  Patient has no known allergies.    Current Outpatient Medications:   •  busPIRone (BUSPAR) 7.5 MG tablet, Take 7.5 mg by mouth 3  (Three) Times a Day. As needed, Disp: , Rfl:   •  desvenlafaxine (PRISTIQ) 100 MG 24 hr tablet, Take 100 mg by mouth Daily., Disp: , Rfl:   •  Ergocalciferol (VITAMIN D2 PO), Take 1.25 mg by mouth 1 (One) Time Per Week., Disp: , Rfl:   •  esomeprazole (nexIUM) 40 MG capsule, Take 1 capsule by mouth every morning (before breakfast), Disp: , Rfl:   •  estradiol (ESTRACE) 1 MG tablet, Take 0.5 mg by mouth Daily., Disp: , Rfl:   •  gabapentin (NEURONTIN) 300 MG capsule, Take 300 mg by mouth 2 (Two) Times a Day., Disp: , Rfl:   •  meloxicam (MOBIC) 7.5 MG tablet, Take 7.5 mg by mouth Daily., Disp: , Rfl:   •  Praluent 75 MG/ML solution auto-injector, , Disp: , Rfl:     Objective     Vital Signs:  Temp:  [98.4 °F (36.9 °C)] 98.4 °F (36.9 °C)  Heart Rate:  [78] 78  BP: (140)/(68) 140/68    Physical Exam:  Physical Exam  Vitals and nursing note reviewed.   Constitutional:       General: She is not in acute distress.     Appearance: She is well-developed. She is not diaphoretic.   HENT:      Head: Normocephalic and atraumatic.      Right Ear: External ear normal.      Left Ear: External ear normal.      Ears:        Nose: Nose normal.     Eyes:      General: No scleral icterus.        Right eye: No discharge.         Left eye: No discharge.      Conjunctiva/sclera: Conjunctivae normal.      Pupils: Pupils are equal, round, and reactive to light.   Neck:      Thyroid: No thyromegaly.      Vascular: No JVD.      Trachea: No tracheal deviation.   Pulmonary:      Effort: Pulmonary effort is normal.      Breath sounds: No stridor.   Musculoskeletal:         General: No deformity. Normal range of motion.      Cervical back: Normal range of motion and neck supple.   Lymphadenopathy:      Cervical: No cervical adenopathy.   Skin:     General: Skin is warm and dry.      Coloration: Skin is not pale.      Findings: No erythema or rash.   Neurological:      Mental Status: She is alert and oriented to person, place, and time.       Cranial Nerves: No cranial nerve deficit.      Coordination: Coordination normal.   Psychiatric:         Speech: Speech normal.         Behavior: Behavior normal. Behavior is cooperative.         Thought Content: Thought content normal.         Judgment: Judgment normal.             Assessment   1. Intranasal nodule    2. Neoplasm of uncertain behavior of skin        Plan     I have offered biopsy of the lesions today in the office.  We will call with pathology and plan.    The risks, benefits, and alternatives of the procedure including but not limited to pain, scarring, bleeding, infection, persistent symptoms, and risks of the anesthesia were discussed full with the patient. Need for further therapy, depending on the pathologic outcome, was discussed. Questions were answered. No guarantees were made or implied.      My findings and recommendations were discussed and questions were answered.     Elijah Rey MD  11/09/22  16:20 CST

## 2022-11-14 LAB
CYTO UR: NORMAL
CYTO UR: NORMAL
LAB AP CASE REPORT: NORMAL
LAB AP CASE REPORT: NORMAL
LAB AP CLINICAL INFORMATION: NORMAL
LAB AP CLINICAL INFORMATION: NORMAL
Lab: NORMAL
Lab: NORMAL
PATH REPORT.FINAL DX SPEC: NORMAL
PATH REPORT.FINAL DX SPEC: NORMAL
PATH REPORT.GROSS SPEC: NORMAL
PATH REPORT.GROSS SPEC: NORMAL

## 2022-11-15 ENCOUNTER — TELEPHONE (OUTPATIENT)
Dept: OTOLARYNGOLOGY | Facility: CLINIC | Age: 67
End: 2022-11-15

## 2022-11-15 NOTE — TELEPHONE ENCOUNTER
Patient has been contacted with results of her biopsies all is cleared she will follow up as scheduled.

## 2023-03-29 ENCOUNTER — OFFICE VISIT (OUTPATIENT)
Dept: OTOLARYNGOLOGY | Facility: CLINIC | Age: 68
End: 2023-03-29
Payer: COMMERCIAL

## 2023-03-29 VITALS
TEMPERATURE: 98 F | WEIGHT: 135 LBS | HEART RATE: 82 BPM | DIASTOLIC BLOOD PRESSURE: 75 MMHG | HEIGHT: 65 IN | SYSTOLIC BLOOD PRESSURE: 140 MMHG | BODY MASS INDEX: 22.49 KG/M2 | RESPIRATION RATE: 20 BRPM

## 2023-03-29 DIAGNOSIS — J34.89: Primary | ICD-10-CM

## 2023-03-29 DIAGNOSIS — D48.5 NEOPLASM OF UNCERTAIN BEHAVIOR OF SKIN: ICD-10-CM

## 2023-03-29 PROCEDURE — 99213 OFFICE O/P EST LOW 20 MIN: CPT | Performed by: OTOLARYNGOLOGY

## 2023-03-29 NOTE — PROGRESS NOTES
PRIMARY CARE PROVIDER: Sebastián Batista DO  REFERRING PROVIDER: No ref. provider found    Chief Complaint   Patient presents with   • Follow-up     nose lesion       Subjective   History of Present Illness:  Jenni Ahn is a  67 y.o. female who presents for consultation regarding a skin lesion of the right nostril.  The lesion has the following characteristics:    Location: right internal nare  Quality: palpable fullness  Severity: mild  Duration: since 1 week after the prior biopsy (11 months)  Timing: constant  Modifying Factors: Prior biopsy  Associated Signs & Symptoms: no nasal obstruction, pain, bleeding.    I saw her on 3/29/22 with a lesion of the right conchal bowl - biopsy performed and a lesion of the right nostril (thickened mucosa at the profection of the intermediate azra) - biopsy was c/w actinic keratosis.    Review of Systems:  Review of Systems   Constitutional: Negative for chills, fatigue, fever and unexpected weight change.   HENT: Negative for facial swelling.    Respiratory: Negative for cough, chest tightness and shortness of breath.    Cardiovascular: Negative for chest pain.   Musculoskeletal: Negative for neck pain.   Skin: Negative for color change.   Neurological: Negative for facial asymmetry.   Hematological: Negative for adenopathy. Does not bruise/bleed easily.       Past History:  Past Medical History:   Diagnosis Date   • Allergic    • Anxiety    • Depression    • Elevated cholesterol    • Endometriosis     had hysterectomy   • Family history of colonic polyps    • Female infertility got pregnant in 1984    had endometriosis   • GERD (gastroesophageal reflux disease)    • Herpes ?? or HPV ??    HPV or Herpes ??   • HPV (human papilloma virus) infection or Herpes ? approx. [id number]    (See  records) From  During 5 yr. marriage.   • IBS (irritable bowel syndrome)    • Kidney stone    • PONV (postoperative nausea and vomiting)    • Sinusitis    • Skin cancer    •  Urinary tract infection    • Varicella ??     Past Surgical History:   Procedure Laterality Date   • APPENDECTOMY     • BREAUX PROCEDURE  07/29/2015   • BREAST BIOPSY  2001, ?    x 2   • CATARACT EXTRACTION  2020   • COLONOSCOPY  05/14/2015    Normal; Repeat 5 years   • COLONOSCOPY  08/17/2007    Normal; Repeat 7 years   • COLONOSCOPY N/A 7/17/2020    Procedure: COLONOSCOPY WITH ANESTHESIA;  Surgeon: Geraldine Gonzalez MD;  Location: [facility] ENDOSCOPY;  Service: Gastroenterology;  Laterality: N/A;  preop; screening   postop  PCP Sebastián Batista    • DIAGNOSTIC LAPAROSCOPY EXPLORATORY LAPAROTOMY      For endometriosis   • ENDOSCOPY  07/29/2015    Medium-sized HH; Normal stomach; Normal examined duodenum; BRAVO pH capsule deployed; No specimens collected   • ENDOSCOPY  05/14/2015    HH; Schatzki's ring-dilated to 20mm; LA grade B esophagitis   • HYSTERECTOMY     • TONSILLECTOMY     • TOTAL ABDOMINAL HYSTERECTOMY WITH SALPINGO OOPHORECTOMY  1998    Dr. Aldana   • WISDOM TOOTH EXTRACTION  [id number]     Family History   Problem Relation Age of Onset   • Breast cancer Mother    • Colon polyps Mother         unk age--she has passed away   • Hypertension Mother    • Stroke Father    • Prostate cancer Brother    • Diabetes Brother    • Stroke Paternal Grandfather         I think   • Colon cancer Neg Hx    • Esophageal cancer Neg Hx    • Liver cancer Neg Hx    • Liver disease Neg Hx    • Rectal cancer Neg Hx    • Stomach cancer Neg Hx      Social History     Tobacco Use   • Smoking status: Never   • Smokeless tobacco: Never   Substance Use Topics   • Alcohol use: Yes     Alcohol/week: 0.0 standard drinks     Comment: Mixed drink once in a while. Not even once a month.   • Drug use: Never     Allergies:  Patient has no known allergies.    Current Outpatient Medications:   •  busPIRone (BUSPAR) 7.5 MG tablet, Take 1 tablet by mouth 3 (Three) Times a Day. As needed, Disp: , Rfl:   •  desvenlafaxine (PRISTIQ) 100 MG 24 hr tablet, Take 1  tablet by mouth Daily., Disp: , Rfl:   •  Ergocalciferol (VITAMIN D2 PO), Take 1.25 mg by mouth 1 (One) Time Per Week., Disp: , Rfl:   •  esomeprazole (nexIUM) 40 MG capsule, Take 1 capsule by mouth every morning (before breakfast), Disp: , Rfl:   •  estradiol (ESTRACE) 1 MG tablet, Take 0.5 mg by mouth Daily., Disp: , Rfl:   •  gabapentin (NEURONTIN) 300 MG capsule, Take 1 capsule by mouth 2 (Two) Times a Day., Disp: , Rfl:   •  meloxicam (MOBIC) 7.5 MG tablet, Take 1 tablet by mouth Daily., Disp: , Rfl:   •  Praluent 75 MG/ML solution auto-injector, , Disp: , Rfl:     Objective     Vital Signs:  Temp:  [98 °F (36.7 °C)] 98 °F (36.7 °C)  Heart Rate:  [82] 82  Resp:  [20] 20  BP: (140)/(75) 140/75    Physical Exam:  Physical Exam  Vitals and nursing note reviewed.   Constitutional:       General: She is not in acute distress.     Appearance: She is well-developed. She is not diaphoretic.   HENT:      Head: Normocephalic and atraumatic.      Right Ear: External ear normal.      Left Ear: External ear normal.      Nose: Nose normal.     Eyes:      General: No scleral icterus.        Right eye: No discharge.         Left eye: No discharge.      Conjunctiva/sclera: Conjunctivae normal.      Pupils: Pupils are equal, round, and reactive to light.   Neck:      Thyroid: No thyromegaly.      Vascular: No JVD.      Trachea: No tracheal deviation.   Pulmonary:      Effort: Pulmonary effort is normal.      Breath sounds: No stridor.   Musculoskeletal:         General: No deformity. Normal range of motion.      Cervical back: Normal range of motion and neck supple.   Lymphadenopathy:      Cervical: No cervical adenopathy.   Skin:     General: Skin is warm and dry.      Coloration: Skin is not pale.      Findings: No erythema or rash.   Neurological:      Mental Status: She is alert and oriented to person, place, and time.      Cranial Nerves: No cranial nerve deficit.      Coordination: Coordination normal.   Psychiatric:          Speech: Speech normal.         Behavior: Behavior normal. Behavior is cooperative.         Thought Content: Thought content normal.         Judgment: Judgment normal.       Data Review:  Prior pathology was reviewed:            Assessment   1. Intranasal nodule    2. Neoplasm of uncertain behavior of skin        Plan     I discussed that this looks like the normal cartilage protruding into the nose.  I offered biopsy versus close observation.  We opted for observation - f/u 3 months.    My findings and recommendations were discussed and questions were answered.     Elijah Rey MD  03/29/23  16:47 CDT

## 2023-06-12 DIAGNOSIS — K64.9 HEMORRHOIDS, UNSPECIFIED HEMORRHOID TYPE: Primary | ICD-10-CM

## 2023-06-12 RX ORDER — HYDROCORTISONE 25 MG/G
CREAM TOPICAL 2 TIMES DAILY
Qty: 28 G | Refills: 1 | Status: SHIPPED | OUTPATIENT
Start: 2023-06-12

## 2023-09-11 ENCOUNTER — OFFICE VISIT (OUTPATIENT)
Dept: GASTROENTEROLOGY | Facility: CLINIC | Age: 68
End: 2023-09-11
Payer: COMMERCIAL

## 2023-09-11 VITALS
SYSTOLIC BLOOD PRESSURE: 114 MMHG | HEIGHT: 65 IN | BODY MASS INDEX: 21.99 KG/M2 | HEART RATE: 95 BPM | DIASTOLIC BLOOD PRESSURE: 72 MMHG | WEIGHT: 132 LBS | TEMPERATURE: 97.9 F | OXYGEN SATURATION: 99 %

## 2023-09-11 DIAGNOSIS — K21.9 GASTROESOPHAGEAL REFLUX DISEASE, UNSPECIFIED WHETHER ESOPHAGITIS PRESENT: ICD-10-CM

## 2023-09-11 DIAGNOSIS — K59.00 CONSTIPATION, UNSPECIFIED CONSTIPATION TYPE: Primary | ICD-10-CM

## 2023-09-11 PROCEDURE — 99214 OFFICE O/P EST MOD 30 MIN: CPT | Performed by: NURSE PRACTITIONER

## 2023-09-11 RX ORDER — FLUTICASONE PROPIONATE 50 MCG
1 SPRAY, SUSPENSION (ML) NASAL 2 TIMES DAILY
COMMUNITY
Start: 2023-09-07

## 2023-09-11 RX ORDER — LEVOCETIRIZINE DIHYDROCHLORIDE 5 MG/1
5 TABLET, FILM COATED ORAL
COMMUNITY
Start: 2023-09-07

## 2023-09-11 RX ORDER — EVOLOCUMAB 140 MG/ML
140 INJECTION, SOLUTION SUBCUTANEOUS
COMMUNITY
Start: 2023-08-09

## 2023-09-11 RX ORDER — AZELASTINE HYDROCHLORIDE 137 UG/1
2 SPRAY, METERED NASAL 2 TIMES DAILY
COMMUNITY
Start: 2023-09-07

## 2023-09-11 RX ORDER — ERGOCALCIFEROL 1.25 MG/1
50000 CAPSULE ORAL WEEKLY
COMMUNITY
Start: 2023-06-23

## 2023-09-11 NOTE — PROGRESS NOTES
Primary Physician: Sebastián Batista DO    Chief Complaint   Patient presents with    Follow-up     Pt presents today for hemorrhoid follow up-was started on Anusol cream 6/12/2023; Pt states that helped and isn't having any issues at this time    Constipation     Pt does c/o issues with constipation-is on Linzess daily-is unsure of dose as she gets through pt assistance; Pt states she stopped it at one point due to hair loss but has been taking daily for several weeks now; Pt has used Miralax in the past but that causes diarrhea/incontinence; Pt's last colon was 7/17/2020       Subjective     Jenni Ahn is a 67 y.o. female.    HPI  Constipation  Pt has been taking Linzess for her constipation needs.  She started it several months ago then stopped it until recently. She reports having constipation for 10 years. She has tried Miralax but she lost control of her bowels with that so she had to stop it.  She also takes OTC stool softener.  She has been on Linzess for 2 weeks with little results so far. She has abdominal cramping and bloating.  Currently having 1-2 BM's per week.  Last colonoscopy July 2020.    Hemorrhoids  She has external hemorrhoids.  She has started Anusol Cream and that helps.  Hemorrhoids are worse when she strains to move her bowels.    Family Hx Colon Polyps  Mother had colon polyps. Her age was unknown.    Last colonoscopy 7/17/2020: entire examined colon is normal. No specimens taken.    GERD  She admits to eating a lot of chocolate daily.  Also using Mobic once daily.  Taking Esomeprazole 40mg daily.  No dypshagia.  Last endoscopy 7/29/2015: medium sized HH, stomach normal, and duodenum normal.    Esophagus XR 2/2015: poor peristaltic wave.    Past Medical History:   Diagnosis Date    Allergic     Anxiety     Depression     Elevated cholesterol     Endometriosis     Family history of colonic polyps     Female infertility     GERD (gastroesophageal reflux disease)     Herpes     History of skin  cancer     HPV (human papilloma virus) infection     (See  records) From  During 5 yr. marriage.    IBS (irritable bowel syndrome)     Kidney stone     PONV (postoperative nausea and vomiting)     Sinusitis     Urinary tract infection     Varicella ??       Past Surgical History:   Procedure Laterality Date    APPENDECTOMY      BREAUX PROCEDURE  07/29/2015    BREAST BIOPSY  2001    x 2    CATARACT EXTRACTION  2020    COLONOSCOPY  05/14/2015    Normal; Repeat 5 years    COLONOSCOPY  08/17/2007    Normal; Repeat 7 years    COLONOSCOPY N/A 07/17/2020    The entire examined colon is normal on direct and retroflexion views; No specimens collected; Repeat 5 years    DIAGNOSTIC LAPAROSCOPY EXPLORATORY LAPAROTOMY      For endometriosis    ENDOSCOPY  07/29/2015    Medium-sized HH; Normal stomach; Normal examined duodenum; BRAVO pH capsule deployed; No specimens collected    ENDOSCOPY  05/14/2015    HH; Schatzki's ring-dilated to 20mm; LA grade B esophagitis    HYSTERECTOMY      TONSILLECTOMY      TOTAL ABDOMINAL HYSTERECTOMY WITH SALPINGO OOPHORECTOMY  1998    Dr. Aldana    WISDOM TOOTH EXTRACTION  [id number]        Current Outpatient Medications:     Azelastine HCl 137 MCG/SPRAY solution, 2 sprays into the nostril(s) as directed by provider 2 (Two) Times a Day., Disp: , Rfl:     busPIRone (BUSPAR) 7.5 MG tablet, Take 1 tablet by mouth 3 (Three) Times a Day. As needed, Disp: , Rfl:     desvenlafaxine (PRISTIQ) 100 MG 24 hr tablet, Take 1 tablet by mouth Daily., Disp: , Rfl:     esomeprazole (nexIUM) 40 MG capsule, Take 1 capsule by mouth every morning (before breakfast), Disp: , Rfl:     estradiol (ESTRACE) 1 MG tablet, Take 0.5 tablets by mouth Daily., Disp: , Rfl:     fluticasone (FLONASE) 50 MCG/ACT nasal spray, 1 spray into the nostril(s) as directed by provider 2 (Two) Times a Day., Disp: , Rfl:     gabapentin (NEURONTIN) 300 MG capsule, Take 1 capsule by mouth 2 (Two) Times a Day., Disp: , Rfl:      Hydrocortisone, Perianal, (ANUSOL-HC) 2.5 % rectal cream, Insert  into the rectum 2 (Two) Times a Day. (Patient taking differently: Insert 1 application  into the rectum As Needed.), Disp: 28 g, Rfl: 1    levocetirizine (XYZAL) 5 MG tablet, Take 1 tablet by mouth every night at bedtime., Disp: , Rfl:     linaCLOtide (LINZESS PO), Take 1 capsule by mouth Daily. 9/11/2023-Pt unsure of dosage, Disp: , Rfl:     meloxicam (MOBIC) 7.5 MG tablet, Take 1 tablet by mouth Daily., Disp: , Rfl:     Repatha solution prefilled syringe injection, Inject 1 mL under the skin into the appropriate area as directed Every 14 (Fourteen) Days., Disp: , Rfl:     vitamin D (ERGOCALCIFEROL) 1.25 MG (45017 UT) capsule capsule, Take 1 capsule by mouth 1 (One) Time Per Week., Disp: , Rfl:     No Known Allergies    Social History     Socioeconomic History    Marital status:    Tobacco Use    Smoking status: Never    Smokeless tobacco: Never   Vaping Use    Vaping Use: Never used   Substance and Sexual Activity    Alcohol use: Yes     Comment: Occasionally-Mixed drink once in a while. Once or twice a month.    Drug use: Never    Sexual activity: Not Currently     Partners: Male     Birth control/protection: Surgical     Comment: Hysterectomy       Family History   Problem Relation Age of Onset    Breast cancer Mother     Colon polyps Mother         Unknown age    Hypertension Mother     Stroke Father     Prostate cancer Brother     Diabetes Brother     Colon polyps Brother         > 60 years of age    Stroke Paternal Grandfather         I think    Colon cancer Neg Hx     Esophageal cancer Neg Hx     Liver cancer Neg Hx     Liver disease Neg Hx     Rectal cancer Neg Hx     Stomach cancer Neg Hx        Review of Systems   Constitutional:  Negative for unexpected weight change.   Gastrointestinal:  Positive for constipation. Negative for abdominal pain.     Objective     /72 (BP Location: Left arm, Patient Position: Sitting, Cuff Size:  "Adult)   Pulse 95   Temp 97.9 °F (36.6 °C) (Infrared)   Ht 163.8 cm (64.5\")   Wt 59.9 kg (132 lb)   SpO2 99%   Breastfeeding No   BMI 22.31 kg/m²     Physical Exam  Vitals reviewed.   Constitutional:       Appearance: Normal appearance.   Neurological:      Mental Status: She is alert.           IMPRESSION/PLAN:    Assessment & Plan      Problem List Items Addressed This Visit          Gastrointestinal Abdominal     Constipation - Primary    Current Assessment & Plan     Continue Linzess therapy and add in a daily stool softener OTC. Will see how this helps and follow up in 2 months         GERD (gastroesophageal reflux disease)    Current Assessment & Plan     ..Pt is instructed to avoid caffeine, chocolate, peppermint and nicotine.  They are to avoid eating within 2-3 hours prior to bedtime.            Begin daily stool softeners in addition to her daily Linzess therapy.  For hemorrhoids use warm bath soaks and Anusol prn    Stop Mobic if possible given increase in acid reflux    ..Pt is instructed to avoid caffeine, chocolate, peppermint and nicotine.  They are to avoid eating within 2-3 hours prior to bedtime.  Educated on Esophageal Dysphagia.        Follow up 2 months              Cori Cedillo, APRN  09/11/23  11:03 CDT    Part of this note may be an electronic transcription/translation of spoken language to printed text.      "

## 2023-10-12 ENCOUNTER — TELEPHONE (OUTPATIENT)
Dept: GASTROENTEROLOGY | Facility: CLINIC | Age: 68
End: 2023-10-12
Payer: COMMERCIAL

## 2023-10-12 NOTE — TELEPHONE ENCOUNTER
Pt left me a VM stating you wanted to know what her Linzess dosage is-she saw you in the office last month and she was unsure of dosage. She states on VM she takes 145mcg daily. Just an FYI for you!

## 2023-10-31 ENCOUNTER — OFFICE VISIT (OUTPATIENT)
Dept: GASTROENTEROLOGY | Facility: CLINIC | Age: 68
End: 2023-10-31
Payer: COMMERCIAL

## 2023-10-31 ENCOUNTER — APPOINTMENT (OUTPATIENT)
Dept: GENERAL RADIOLOGY | Facility: HOSPITAL | Age: 68
End: 2023-10-31
Payer: COMMERCIAL

## 2023-10-31 ENCOUNTER — HOSPITAL ENCOUNTER (EMERGENCY)
Facility: HOSPITAL | Age: 68
Discharge: HOME | End: 2023-10-31
Attending: INTERNAL MEDICINE | Admitting: INTERNAL MEDICINE
Payer: COMMERCIAL

## 2023-10-31 ENCOUNTER — APPOINTMENT (OUTPATIENT)
Dept: CT IMAGING | Facility: HOSPITAL | Age: 68
End: 2023-10-31
Payer: COMMERCIAL

## 2023-10-31 VITALS
HEIGHT: 64 IN | RESPIRATION RATE: 15 BRPM | HEART RATE: 69 BPM | SYSTOLIC BLOOD PRESSURE: 165 MMHG | WEIGHT: 141 LBS | OXYGEN SATURATION: 98 % | TEMPERATURE: 98.1 F | BODY MASS INDEX: 24.07 KG/M2 | DIASTOLIC BLOOD PRESSURE: 83 MMHG

## 2023-10-31 VITALS
SYSTOLIC BLOOD PRESSURE: 128 MMHG | OXYGEN SATURATION: 98 % | DIASTOLIC BLOOD PRESSURE: 80 MMHG | HEART RATE: 86 BPM | WEIGHT: 140 LBS | TEMPERATURE: 98 F | BODY MASS INDEX: 23.32 KG/M2 | HEIGHT: 65 IN

## 2023-10-31 DIAGNOSIS — K59.00 CONSTIPATION, UNSPECIFIED CONSTIPATION TYPE: ICD-10-CM

## 2023-10-31 DIAGNOSIS — M25.532 BILATERAL WRIST PAIN: ICD-10-CM

## 2023-10-31 DIAGNOSIS — T14.8XXA ABRASION: ICD-10-CM

## 2023-10-31 DIAGNOSIS — K21.9 GASTROESOPHAGEAL REFLUX DISEASE, UNSPECIFIED WHETHER ESOPHAGITIS PRESENT: Primary | ICD-10-CM

## 2023-10-31 DIAGNOSIS — M62.89 PELVIC FLOOR DYSFUNCTION IN FEMALE: ICD-10-CM
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
## 2023-10-31 DIAGNOSIS — M25.531 BILATERAL WRIST PAIN: ICD-10-CM

## 2023-10-31 DIAGNOSIS — S00.83XA FACIAL HEMATOMA, INITIAL ENCOUNTER: ICD-10-CM

## 2023-10-31 PROCEDURE — 72125 CT NECK SPINE W/O DYE: CPT

## 2023-10-31 PROCEDURE — 73110 X-RAY EXAM OF WRIST: CPT

## 2023-10-31 PROCEDURE — 99284 EMERGENCY DEPT VISIT MOD MDM: CPT

## 2023-10-31 PROCEDURE — 70450 CT HEAD/BRAIN W/O DYE: CPT

## 2023-10-31 PROCEDURE — 70486 CT MAXILLOFACIAL W/O DYE: CPT

## 2023-10-31 PROCEDURE — 99214 OFFICE O/P EST MOD 30 MIN: CPT | Performed by: NURSE PRACTITIONER

## 2023-10-31 RX ORDER — HYDROCODONE BITARTRATE AND ACETAMINOPHEN 5; 325 MG/1; MG/1
1 TABLET ORAL ONCE
Status: COMPLETED | OUTPATIENT
Start: 2023-10-31 | End: 2023-10-31

## 2023-10-31 RX ORDER — HYDROCODONE BITARTRATE AND ACETAMINOPHEN 7.5; 325 MG/1; MG/1
1 TABLET ORAL EVERY 6 HOURS PRN
Qty: 10 TABLET | Refills: 0 | Status: SHIPPED | OUTPATIENT
Start: 2023-10-31

## 2023-10-31 RX ADMIN — HYDROCODONE BITARTRATE AND ACETAMINOPHEN 1 TABLET: 5; 325 TABLET ORAL at 21:22

## 2023-10-31 NOTE — ASSESSMENT & PLAN NOTE
Increase Linzess to 290mcg/day. I have given her samples to try this for 1 month and she will my chart message me letting me know how effective it was.  If effective I will rewrite her prescription for the higher dose.

## 2023-10-31 NOTE — ASSESSMENT & PLAN NOTE
..Pt is instructed to avoid caffeine, chocolate, peppermint and nicotine.  They are to avoid eating within 2-3 hours prior to bedtime.

## 2023-10-31 NOTE — ASSESSMENT & PLAN NOTE
I am going to have her see Marilou Pickett with [facility] for consultation. I would like to see if she has any non surgical techniques/exercises that may help with her bladder and bowel issues

## 2023-10-31 NOTE — PROGRESS NOTES
Primary Physician: Sebastián Batista DO    Chief Complaint   Patient presents with    GI Problem     Follow up for constipation       Subjective     Jenni Ahn is a 68 y.o. female.    HPI  Constipation follow-up  Patient has had constipation issues for greater than 10 years.  She had tried MiraLAX in the past but that caused her to lose control of her bowel pattern.  Had been on Linzess in the past but stopped it, therefore the last time I saw her on 9/11/2023 we decided to start Linzess back.  Last colonoscopy July 2020: Entire examined colon normal. 5 year recall.    TODAY, pt reports she still has fullness in her entire abdomen.  She had tried a stool softener one day and  to bowel incontinence.  The Linzess 145mcg/day has not seemed to help either.  Also problematic is an increase in severity of bladder incontinence.  This has become a real issue for her.    She reports her constipation is related to starting her Pristiq.  She will have a very small amount of BM daily but she never feels like she has emptied out.    Surgical Hx includes appy & total hyst. I have concern for pelvic floor dysfunction.      GERD  Pt having a lot of reflux.  Last endoscopy 7/29/2015: medium sized HH, stomach and duodenum normal.  She has stopped her Mobic trying to see if that is making a difference in her    Past Medical History:   Diagnosis Date    Allergic     Anxiety     Depression     Elevated cholesterol     Endometriosis     Family history of colonic polyps     Female infertility     GERD (gastroesophageal reflux disease)     Herpes     History of skin cancer     HPV (human papilloma virus) infection     (See  records) From  During 5 yr. marriage.    IBS (irritable bowel syndrome)     Kidney stone     PONV (postoperative nausea and vomiting)     Sinusitis     Urinary tract infection     Varicella ??       Past Surgical History:   Procedure Laterality Date    APPENDECTOMY      BREAUX PROCEDURE   07/29/2015    BREAST BIOPSY  2001    x 2    CATARACT EXTRACTION  2020    COLONOSCOPY  05/14/2015    Normal; Repeat 5 years    COLONOSCOPY  08/17/2007    Normal; Repeat 7 years    COLONOSCOPY N/A 07/17/2020    The entire examined colon is normal on direct and retroflexion views; No specimens collected; Repeat 5 years    DIAGNOSTIC LAPAROSCOPY EXPLORATORY LAPAROTOMY      For endometriosis    ENDOSCOPY  07/29/2015    Medium-sized HH; Normal stomach; Normal examined duodenum; BRAVO pH capsule deployed; No specimens collected    ENDOSCOPY  05/14/2015    HH; Schatzki's ring-dilated to 20mm; LA grade B esophagitis    HYSTERECTOMY      TONSILLECTOMY      TOTAL ABDOMINAL HYSTERECTOMY WITH SALPINGO OOPHORECTOMY  1998    Dr. Jovan PORTILLO TOOTH EXTRACTION  [id number]        Current Outpatient Medications:     Azelastine HCl 137 MCG/SPRAY solution, 2 sprays into the nostril(s) as directed by provider 2 (Two) Times a Day., Disp: , Rfl:     busPIRone (BUSPAR) 7.5 MG tablet, Take 1 tablet by mouth Daily. As needed, Disp: , Rfl:     desvenlafaxine (PRISTIQ) 100 MG 24 hr tablet, Take 1 tablet by mouth Daily., Disp: , Rfl:     esomeprazole (nexIUM) 40 MG capsule, Take 1 capsule by mouth every morning (before breakfast), Disp: , Rfl:     estradiol (ESTRACE) 1 MG tablet, Take 0.5 tablets by mouth Daily., Disp: , Rfl:     fluticasone (FLONASE) 50 MCG/ACT nasal spray, 1 spray into the nostril(s) as directed by provider 2 (Two) Times a Day., Disp: , Rfl:     gabapentin (NEURONTIN) 300 MG capsule, Take 1 capsule by mouth 2 (Two) Times a Day., Disp: , Rfl:     levocetirizine (XYZAL) 5 MG tablet, Take 1 tablet by mouth every night at bedtime., Disp: , Rfl:     linaclotide (Linzess) 145 MCG capsule capsule, Take 1 capsule by mouth Daily., Disp: , Rfl:     Repatha solution prefilled syringe injection, Inject 1 mL under the skin into the appropriate area as directed Every 14 (Fourteen) Days., Disp: , Rfl:     vitamin D (ERGOCALCIFEROL) 1.25  "MG (66685 UT) capsule capsule, Take 1 capsule by mouth 1 (One) Time Per Week., Disp: , Rfl:     HYDROcodone-acetaminophen (Norco) 7.5-325 MG per tablet, Take 1 tablet by mouth Every 6 (Six) Hours As Needed for Moderate Pain., Disp: 10 tablet, Rfl: 0    Hydrocortisone, Perianal, (ANUSOL-HC) 2.5 % rectal cream, Insert  into the rectum 2 (Two) Times a Day. (Patient taking differently: Insert 1 application  into the rectum As Needed.), Disp: 28 g, Rfl: 1    meloxicam (MOBIC) 7.5 MG tablet, Take 1 tablet by mouth Daily. (Patient not taking: Reported on 10/31/2023), Disp: , Rfl:   No current facility-administered medications for this visit.    No Known Allergies    Social History     Socioeconomic History    Marital status:    Tobacco Use    Smoking status: Never    Smokeless tobacco: Never   Vaping Use    Vaping Use: Never used   Substance and Sexual Activity    Alcohol use: Yes     Comment: Occasionally-Mixed drink once in a while. Once or twice a month.    Drug use: Never    Sexual activity: Not Currently     Partners: Male     Birth control/protection: Surgical     Comment: Hysterectomy       Family History   Problem Relation Age of Onset    Breast cancer Mother     Colon polyps Mother         Unknown age    Hypertension Mother     Stroke Father     Prostate cancer Brother     Diabetes Brother     Colon polyps Brother         > 60 years of age    Stroke Paternal Grandfather         I think    Colon cancer Neg Hx     Esophageal cancer Neg Hx     Liver cancer Neg Hx     Liver disease Neg Hx     Rectal cancer Neg Hx     Stomach cancer Neg Hx        Review of Systems   Cardiovascular:  Negative for chest pain.   Gastrointestinal:  Positive for constipation.   Genitourinary:  Positive for urgency.       Objective     /80   Pulse 86   Temp 98 °F (36.7 °C)   Ht 163.8 cm (64.5\")   Wt 63.5 kg (140 lb)   SpO2 98%   BMI 23.66 kg/m²     Physical Exam  Vitals reviewed.   Constitutional:       Appearance: Normal " appearance.   Neurological:      Mental Status: She is alert.               IMPRESSION/PLAN:    Assessment & Plan      Problem List Items Addressed This Visit          Gastrointestinal Abdominal     Constipation    Overview     Constipation associated with abdominal bloating and fullness         Current Assessment & Plan     Increase Linzess to 290mcg/day. I have given her samples to try this for 1 month and she will my chart message me letting me know how effective it was.  If effective I will rewrite her prescription for the higher dose.         GERD (gastroesophageal reflux disease) - Primary    Overview     Has a lot of reflux despite daily PPI         Current Assessment & Plan     ..Pt is instructed to avoid caffeine, chocolate, peppermint and nicotine.  They are to avoid eating within 2-3 hours prior to bedtime.              Musculoskeletal and Injuries    Pelvic floor dysfunction in female    Overview     Suspected pelvic floor dysfunction, Pt having bladder leakage, and increase constipation issues.           Current Assessment & Plan     I am going to have her see Marilou Pickett with [facility] for consultation. I would like to see if she has any non surgical techniques/exercises that may help with her bladder and bowel issues          2-month follow-up                Cori Cedillo, APRN  11/01/23  15:30 CDT    Part of this note may be an electronic transcription/translation of spoken language to printed text.

## 2023-11-01 ENCOUNTER — TELEPHONE (OUTPATIENT)
Dept: GASTROENTEROLOGY | Facility: CLINIC | Age: 68
End: 2023-11-01
Payer: COMMERCIAL

## 2023-11-01 NOTE — TELEPHONE ENCOUNTER
Faxed pt's records to [phone]. Spoke with the physical therapist, Marilou, and she tells me it may be a few weeks because she is booked out, but she will call the pt to schedule.

## 2023-11-01 NOTE — DISCHARGE INSTRUCTIONS
Keep areas of abrasion clean and dry.  Watch for signs of infection to include drainage, redness, or increased pain.  Fall precautions.  Follow-up with PCP this week if possible.  Pain medication was called into your pharmacy this evening, as we discussed.  Return to the emergency department if symptoms worsen or fail to improve.

## 2023-11-01 NOTE — ED PROVIDER NOTES
Subjective   History of Present Illness  68-year-old female who presents emergency department status post fall.  She was outside and tripped over her tiedown for her inflatable Halloween decorations.  She fell forward hitting both of her wrists outstretched on the ground and her forehead hit the sidewalk.  She notes that range of motion of her wrist are intact but they are painful.  She does note worse pain when trying to  things.  No other injury noted.  No loss of consciousness.    Review of Systems   Musculoskeletal:         Fall   Skin:  Positive for color change and wound.       Past Medical History:   Diagnosis Date    Allergic     Anxiety     Depression     Elevated cholesterol     Endometriosis     Family history of colonic polyps     Female infertility     GERD (gastroesophageal reflux disease)     Herpes     History of skin cancer     HPV (human papilloma virus) infection     (See  records) From  During 5 yr. marriage.    IBS (irritable bowel syndrome)     Kidney stone     PONV (postoperative nausea and vomiting)     Sinusitis     Urinary tract infection     Varicella ??       No Known Allergies    Past Surgical History:   Procedure Laterality Date    APPENDECTOMY      BREAUX PROCEDURE  07/29/2015    BREAST BIOPSY  2001    x 2    CATARACT EXTRACTION  2020    COLONOSCOPY  05/14/2015    Normal; Repeat 5 years    COLONOSCOPY  08/17/2007    Normal; Repeat 7 years    COLONOSCOPY N/A 07/17/2020    The entire examined colon is normal on direct and retroflexion views; No specimens collected; Repeat 5 years    DIAGNOSTIC LAPAROSCOPY EXPLORATORY LAPAROTOMY      For endometriosis    ENDOSCOPY  07/29/2015    Medium-sized HH; Normal stomach; Normal examined duodenum; BRAVO pH capsule deployed; No specimens collected    ENDOSCOPY  05/14/2015    HH; Schatzki's ring-dilated to 20mm; LA grade B esophagitis    HYSTERECTOMY      TONSILLECTOMY      TOTAL ABDOMINAL HYSTERECTOMY WITH SALPINGO OOPHORECTOMY   1998    Dr. Aldana    [facility] TOOTH EXTRACTION  [id number]       Family History   Problem Relation Age of Onset    Breast cancer Mother     Colon polyps Mother         Unknown age    Hypertension Mother     Stroke Father     Prostate cancer Brother     Diabetes Brother     Colon polyps Brother         > 60 years of age    Stroke Paternal Grandfather         I think    Colon cancer Neg Hx     Esophageal cancer Neg Hx     Liver cancer Neg Hx     Liver disease Neg Hx     Rectal cancer Neg Hx     Stomach cancer Neg Hx        Social History     Socioeconomic History    Marital status:    Tobacco Use    Smoking status: Never    Smokeless tobacco: Never   Vaping Use    Vaping Use: Never used   Substance and Sexual Activity    Alcohol use: Yes     Comment: Occasionally-Mixed drink once in a while. Once or twice a month.    Drug use: Never    Sexual activity: Not Currently     Partners: Male     Birth control/protection: Surgical     Comment: Hysterectomy           Objective   Physical Exam  Vitals reviewed.   HENT:      Head: Normocephalic.      Comments: Hematoma above the left eyebrow area.     Right Ear: External ear normal.      Left Ear: External ear normal.      Nose: Nose normal.   Eyes:      General: No scleral icterus.     Conjunctiva/sclera: Conjunctivae normal.      Comments: Hematoma around the left eye.   Cardiovascular:      Rate and Rhythm: Normal rate and regular rhythm.      Heart sounds: Normal heart sounds.   Pulmonary:      Effort: Pulmonary effort is normal.      Breath sounds: Normal breath sounds.   Musculoskeletal:         General: No tenderness.      Cervical back: Normal range of motion and neck supple.      Comments: Range of motion of the wrist is intact.   Skin:     General: Skin is warm and dry.      Comments: Abrasion to the left knee and left palmar surface.   Neurological:      Mental Status: She is alert and oriented to person, place, and time.      Cranial Nerves: No cranial nerve  deficit.   Psychiatric:         Mood and Affect: Mood normal.         Behavior: Behavior normal.         Procedures           ED Course  ED Course as of 10/31/23 2148   Tue Oct 31, 2023   2129 I discussed images with the patient and her family at bedside.  Discussed the abnormality of the left wrist, and additional images needed.  She voiced understanding.  She did state that she needed some pain medication. [AJ]      ED Course User Index  [AJ] Loli Pierce DO           Lab Results (last 24 hours)       ** No results found for the last 24 hours. **            CT Head Without Contrast   Final Result   1. No acute intracranial process.       This report was signed and finalized on 10/31/2023 8:52 PM CDT by Dr Ricardo Antony.          CT Cervical Spine Without Contrast   Final Result   1. No evidence of acute osseous injury or traumatic malalignment in the   cervical spine.   2. Cervical spine osteoarthritis changes.               This report was signed and finalized on 10/31/2023 8:49 PM CDT by Dr Ricardo Antony.          CT Facial Bones Without Contrast   Final Result   1. No acute facial bone fracture.               This report was signed and finalized on 10/31/2023 8:45 PM CDT by Dr Ricardo Antony.          XR Wrist 3+ View Right   Final Result   1. No acute osseous injury or malalignment.               This report was signed and finalized on 10/31/2023 8:41 PM CDT by Dr Ricardo Antony.          XR Wrist 3+ View Left   Final Result   Addendum (preliminary) 1 of 1   ADDENDUM:   Previously described ossific density adjacent to the   distal pole the scaphoid does appear to be degenerative in nature based   on the appearance of the dedicated scaphoid projection.   END   ADDENDUM               This report was signed and finalized on 10/31/2023 9:31 PM CDT by Dr Ricardo Antony.          Final   1. There is a small ossific density seen just radial to the distal pole   the scaphoid. Difficult to say if this is  related to osteoarthritis   change in the triscaphe joint or if this is potentially a distal   articular surface fracture (Bueno type II scaphoid fracture). If there is   point tenderness near the anatomic snuff box you may consider wrist CT   for further evaluation of the scaphoid.   2. Otherwise unremarkable.       This report was signed and finalized on 10/31/2023 8:39 PM CDT by Dr Ricardo Antony.                                            Medical Decision Making  Problems Addressed:  Abrasion: complicated acute illness or injury  Bilateral wrist pain: complicated acute illness or injury  Facial hematoma, initial encounter: complicated acute illness or injury  Fall, initial encounter: complicated acute illness or injury    Amount and/or Complexity of Data Reviewed  Radiology: ordered.     Details: CT head, cervical spine, facial bones, and bilateral wrist x-rays    Risk  Prescription drug management.        Final diagnoses:   Fall, initial encounter   Facial hematoma, initial encounter   Bilateral wrist pain   Abrasion       ED Disposition  ED Disposition       ED Disposition   Discharge    Condition   Stable    Comment   --               Sebastián Batista,   [address]  [place] [id number] [phone]    In 2 days           Medication List        New Prescriptions      HYDROcodone-acetaminophen 7.5-325 MG per tablet  Commonly known as: Norco  Take 1 tablet by mouth Every 6 (Six) Hours As Needed for Moderate Pain.            Changed      Hydrocortisone (Perianal) 2.5 % rectal cream  Commonly known as: ANUSOL-HC  Insert  into the rectum 2 (Two) Times a Day.  What changed:   how much to take  when to take this  reasons to take this               Where to Get Your Medications        These medications were sent to [facility]/pharmacy #[id number] - [place], [place] - [address] - [phone]  - [phone] [address] [address]      Phone: [phone]   HYDROcodone-acetaminophen 7.5-325 MG per tablet             Loli Pierce,   10/31/23 2003       Loli Pierce,   10/31/23 2147

## 2023-11-13 ENCOUNTER — TRANSCRIBE ORDERS (OUTPATIENT)
Dept: ADMINISTRATIVE | Facility: HOSPITAL | Age: 68
End: 2023-11-13
Payer: COMMERCIAL

## 2023-11-13 DIAGNOSIS — S69.92XA INJURY OF LEFT WRIST, INITIAL ENCOUNTER: Primary | ICD-10-CM

## 2023-12-04 DIAGNOSIS — R14.0 BLOATING: Primary | ICD-10-CM

## 2023-12-04 DIAGNOSIS — K59.00 CONSTIPATION, UNSPECIFIED CONSTIPATION TYPE: ICD-10-CM

## 2023-12-05 ENCOUNTER — LAB (OUTPATIENT)
Dept: LAB | Facility: HOSPITAL | Age: 68
End: 2023-12-05
Payer: COMMERCIAL

## 2023-12-05 DIAGNOSIS — R14.0 BLOATING: ICD-10-CM

## 2023-12-05 DIAGNOSIS — K59.00 CONSTIPATION, UNSPECIFIED CONSTIPATION TYPE: ICD-10-CM

## 2023-12-05 LAB
DEPRECATED RDW RBC AUTO: 41.9 FL (ref 37–54)
ERYTHROCYTE [DISTWIDTH] IN BLOOD BY AUTOMATED COUNT: 13.3 % (ref 12.3–15.4)
HCT VFR BLD AUTO: 43.6 % (ref 34–46.6)
HGB BLD-MCNC: 13.7 G/DL (ref 12–15.9)
MCH RBC QN AUTO: 27.2 PG (ref 26.6–33)
MCHC RBC AUTO-ENTMCNC: 31.4 G/DL (ref 31.5–35.7)
MCV RBC AUTO: 86.5 FL (ref 79–97)
PLATELET # BLD AUTO: 325 10*3/MM3 (ref 140–450)
PMV BLD AUTO: 10.5 FL (ref 6–12)
RBC # BLD AUTO: 5.04 10*6/MM3 (ref 3.77–5.28)
T-UPTAKE NFR SERPL: 1.05 TBI (ref 0.8–1.3)
T4 SERPL-MCNC: 10.2 MCG/DL (ref 4.5–11.7)
TSH SERPL DL<=0.05 MIU/L-ACNC: 3.82 UIU/ML (ref 0.27–4.2)
WBC NRBC COR # BLD AUTO: 7.99 10*3/MM3 (ref 3.4–10.8)

## 2023-12-05 PROCEDURE — 84443 ASSAY THYROID STIM HORMONE: CPT

## 2023-12-05 PROCEDURE — 84479 ASSAY OF THYROID (T3 OR T4): CPT

## 2023-12-05 PROCEDURE — 85027 COMPLETE CBC AUTOMATED: CPT | Performed by: NURSE PRACTITIONER

## 2023-12-05 PROCEDURE — 36415 COLL VENOUS BLD VENIPUNCTURE: CPT

## 2023-12-05 PROCEDURE — 84436 ASSAY OF TOTAL THYROXINE: CPT

## 2023-12-07 ENCOUNTER — ANESTHESIA EVENT (OUTPATIENT)
Dept: GASTROENTEROLOGY | Facility: HOSPITAL | Age: 68
End: 2023-12-07
Payer: COMMERCIAL

## 2023-12-07 ENCOUNTER — ANESTHESIA (OUTPATIENT)
Dept: GASTROENTEROLOGY | Facility: HOSPITAL | Age: 68
End: 2023-12-07
Payer: COMMERCIAL

## 2023-12-07 ENCOUNTER — HOSPITAL ENCOUNTER (OUTPATIENT)
Facility: HOSPITAL | Age: 68
Setting detail: HOSPITAL OUTPATIENT SURGERY
Discharge: HOME | End: 2023-12-07
Attending: INTERNAL MEDICINE | Admitting: INTERNAL MEDICINE
Payer: COMMERCIAL

## 2023-12-07 VITALS
DIASTOLIC BLOOD PRESSURE: 64 MMHG | WEIGHT: 138 LBS | RESPIRATION RATE: 20 BRPM | HEIGHT: 64 IN | HEART RATE: 95 BPM | OXYGEN SATURATION: 99 % | BODY MASS INDEX: 23.56 KG/M2 | TEMPERATURE: 97 F | SYSTOLIC BLOOD PRESSURE: 100 MMHG

## 2023-12-07 DIAGNOSIS — R14.0 BLOATING: ICD-10-CM

## 2023-12-07 PROBLEM — J38.5 SPASM OF VOCAL CORDS: Status: ACTIVE | Noted: 2023-12-07

## 2023-12-07 PROCEDURE — 25010000002 PROPOFOL 10 MG/ML EMULSION: Performed by: NURSE ANESTHETIST, CERTIFIED REGISTERED

## 2023-12-07 PROCEDURE — 25810000003 SODIUM CHLORIDE 0.9 % SOLUTION: Performed by: ANESTHESIOLOGY

## 2023-12-07 RX ORDER — LIDOCAINE HYDROCHLORIDE 10 MG/ML
0.5 INJECTION, SOLUTION EPIDURAL; INFILTRATION; INTRACAUDAL; PERINEURAL ONCE AS NEEDED
Status: DISCONTINUED | OUTPATIENT
Start: 2023-12-07 | End: 2023-12-07 | Stop reason: HOSPADM

## 2023-12-07 RX ORDER — SODIUM CHLORIDE 9 MG/ML
1000 INJECTION, SOLUTION INTRAVENOUS CONTINUOUS
Status: DISCONTINUED | OUTPATIENT
Start: 2023-12-07 | End: 2023-12-07 | Stop reason: HOSPADM

## 2023-12-07 RX ORDER — AMLODIPINE BESYLATE 10 MG/1
10 TABLET ORAL DAILY
COMMUNITY

## 2023-12-07 RX ORDER — PROPOFOL 10 MG/ML
VIAL (ML) INTRAVENOUS AS NEEDED
Status: DISCONTINUED | OUTPATIENT
Start: 2023-12-07 | End: 2023-12-07 | Stop reason: SURG

## 2023-12-07 RX ORDER — LOSARTAN POTASSIUM AND HYDROCHLOROTHIAZIDE 12.5; 5 MG/1; MG/1
1 TABLET ORAL DAILY
COMMUNITY

## 2023-12-07 RX ORDER — SODIUM CHLORIDE 0.9 % (FLUSH) 0.9 %
10 SYRINGE (ML) INJECTION AS NEEDED
Status: DISCONTINUED | OUTPATIENT
Start: 2023-12-07 | End: 2023-12-07 | Stop reason: HOSPADM

## 2023-12-07 RX ORDER — LIDOCAINE HYDROCHLORIDE 20 MG/ML
INJECTION, SOLUTION EPIDURAL; INFILTRATION; INTRACAUDAL; PERINEURAL AS NEEDED
Status: DISCONTINUED | OUTPATIENT
Start: 2023-12-07 | End: 2023-12-07 | Stop reason: SURG

## 2023-12-07 RX ORDER — SODIUM CHLORIDE 9 MG/ML
500 INJECTION, SOLUTION INTRAVENOUS CONTINUOUS PRN
Status: DISCONTINUED | OUTPATIENT
Start: 2023-12-07 | End: 2023-12-07 | Stop reason: HOSPADM

## 2023-12-07 RX ADMIN — PROPOFOL INJECTABLE EMULSION 250 MG: 10 INJECTION, EMULSION INTRAVENOUS at 13:46

## 2023-12-07 RX ADMIN — LIDOCAINE HYDROCHLORIDE 50 MG: 20 INJECTION, SOLUTION EPIDURAL; INFILTRATION; INTRACAUDAL; PERINEURAL at 13:46

## 2023-12-07 RX ADMIN — SODIUM CHLORIDE 500 ML: 9 INJECTION, SOLUTION INTRAVENOUS at 11:43

## 2023-12-07 NOTE — ANESTHESIA POSTPROCEDURE EVALUATION
Patient: Jenni Ahn    Procedure Summary       Date: 12/07/23 Room / Location: [facility] ENDOSCOPY 6 / [facility]    Anesthesia Start: 1339 Anesthesia Stop: 1356    Procedure: ESOPHAGOGASTRODUODENOSCOPY WITH ANESTHESIA Diagnosis:       Bloating      (Bloating [R14.0])    Surgeons: Geraldine Gonzalez MD Provider:     Anesthesia Type: MAC ASA Status: 2            Anesthesia Type: MAC    Vitals  Vitals Value Taken Time   /71 12/07/23 1416   Temp     Pulse 77 12/07/23 1418   Resp 20 12/07/23 1415   SpO2 96 % 12/07/23 1418   Vitals shown include unfiled device data.        Post Anesthesia Care and Evaluation    Patient location during evaluation: PHASE II  Level of consciousness: awake  Pain management: adequate    Airway patency: patent  Anesthetic complications: No anesthetic complications  PONV Status: none  Cardiovascular status: acceptable  Respiratory status: acceptable  Hydration status: acceptable

## 2023-12-07 NOTE — ANESTHESIA PREPROCEDURE EVALUATION
Anesthesia Evaluation     Patient summary reviewed   history of anesthetic complications:  PONV  NPO Solid Status: > 8 hours  NPO Liquid Status: > 8 hours           Airway   Mallampati: II  TM distance: >3 FB  No difficulty expected  Dental - normal exam     Pulmonary    (-) asthma, sleep apnea, not a smoker  Cardiovascular   Exercise tolerance: excellent (>7 METS)    (+) hypertension, hyperlipidemia  (-) past MI, dysrhythmias, cardiac stents      Neuro/Psych  (-) seizures, TIA, CVA  GI/Hepatic/Renal/Endo    (+) GERD, renal disease- stones  (-) liver disease, diabetes    Musculoskeletal     Abdominal    Substance History      OB/GYN          Other                    Anesthesia Plan    ASA 2     MAC       Anesthetic plan, risks, benefits, and alternatives have been provided, discussed and informed consent has been obtained with: patient.    CODE STATUS:

## 2023-12-07 NOTE — H&P
Chief Complaint:   GERD    Subjective     HPI:   GERD  Pt having a lot of reflux. Last endoscopy 7/29/2015: medium sized HH, stomach and duodenum normal.  48-hour Bravo while on Nexium 40 mg daily at same time showed complete acid suppression.  She has stopped her Mobic trying to see if that is making a difference in her    Past Medical History:   Past Medical History:   Diagnosis Date    Allergic     Anxiety     Depression     Elevated cholesterol     Endometriosis     Family history of colonic polyps     Female infertility     GERD (gastroesophageal reflux disease)     Herpes     History of skin cancer     HPV (human papilloma virus) infection     (See  records) From  During 5 yr. marriage.    IBS (irritable bowel syndrome)     Kidney stone     PONV (postoperative nausea and vomiting)     Sinusitis     Urinary tract infection     Varicella ??       Past Surgical History:  Past Surgical History:   Procedure Laterality Date    APPENDECTOMY      BERAUX PROCEDURE  07/29/2015    BREAST BIOPSY  2001    x 2    CATARACT EXTRACTION  2020    COLONOSCOPY  05/14/2015    Normal; Repeat 5 years    COLONOSCOPY  08/17/2007    Normal; Repeat 7 years    COLONOSCOPY N/A 07/17/2020    The entire examined colon is normal on direct and retroflexion views; No specimens collected; Repeat 5 years    DIAGNOSTIC LAPAROSCOPY EXPLORATORY LAPAROTOMY      For endometriosis    ENDOSCOPY  07/29/2015    Medium-sized HH; Normal stomach; Normal examined duodenum; BRAVO pH capsule deployed; No specimens collected    ENDOSCOPY  05/14/2015    HH; Schatzki's ring-dilated to 20mm; LA grade B esophagitis    HYSTERECTOMY      TONSILLECTOMY      TOTAL ABDOMINAL HYSTERECTOMY WITH SALPINGO OOPHORECTOMY  1998    Dr. Aldana    [facility]ISDOM TOOTH EXTRACTION  [id number]       Family History:  Family History   Problem Relation Age of Onset    Breast cancer Mother     Colon polyps Mother         Unknown age    Hypertension Mother     Stroke Father      Prostate cancer Brother     Diabetes Brother     Colon polyps Brother         > 60 years of age    Stroke Paternal Grandfather         I think    Colon cancer Neg Hx     Esophageal cancer Neg Hx     Liver cancer Neg Hx     Liver disease Neg Hx     Rectal cancer Neg Hx     Stomach cancer Neg Hx        Social History:   reports that she has never smoked. She has never used smokeless tobacco. She reports current alcohol use. She reports that she does not use drugs.    Medications:   Medications Prior to Admission   Medication Sig Dispense Refill Last Dose    amLODIPine (NORVASC) 10 MG tablet Take 1 tablet by mouth Daily.   12/7/2023    busPIRone (BUSPAR) 7.5 MG tablet Take 1 tablet by mouth Daily. As needed   Past Week    desvenlafaxine (PRISTIQ) 100 MG 24 hr tablet Take 1 tablet by mouth Daily.   12/6/2023    esomeprazole (nexIUM) 40 MG capsule Take 1 capsule by mouth every morning (before breakfast)   12/6/2023    estradiol (ESTRACE) 1 MG tablet Take 0.5 tablets by mouth Daily.   Past Month    gabapentin (NEURONTIN) 300 MG capsule Take 1 capsule by mouth 2 (Two) Times a Day.   12/6/2023    Hydrocortisone, Perianal, (ANUSOL-HC) 2.5 % rectal cream Insert  into the rectum 2 (Two) Times a Day. (Patient taking differently: Insert 1 application  into the rectum As Needed.) 28 g 1 Past Week    levocetirizine (XYZAL) 5 MG tablet Take 1 tablet by mouth every night at bedtime.   12/6/2023    linaclotide (Linzess) 145 MCG capsule capsule Take 1 capsule by mouth Daily.   Past Month    losartan-hydrochlorothiazide (HYZAAR) 50-12.5 MG per tablet Take 1 tablet by mouth Daily.   12/7/2023    meloxicam (MOBIC) 7.5 MG tablet Take 1 tablet by mouth Daily.   Past Month    Repatha solution prefilled syringe injection Inject 1 mL under the skin into the appropriate area as directed Every 14 (Fourteen) Days.   Past Week    vitamin D (ERGOCALCIFEROL) 1.25 MG (27395 UT) capsule capsule Take 1 capsule by mouth 1 (One) Time Per Week.   Past  "Week    Azelastine HCl 137 MCG/SPRAY solution 2 sprays into the nostril(s) as directed by provider 2 (Two) Times a Day.   Unknown    fluticasone (FLONASE) 50 MCG/ACT nasal spray 1 spray into the nostril(s) as directed by provider 2 (Two) Times a Day.       HYDROcodone-acetaminophen (Norco) 7.5-325 MG per tablet Take 1 tablet by mouth Every 6 (Six) Hours As Needed for Moderate Pain. 10 tablet 0 Unknown       Allergies:  Patient has no known allergies.    ROS:    Resp: No SOA  Cardiovascular: No CP      Objective     /64   Pulse 95   Temp 97 °F (36.1 °C)   Resp 20   Ht 162.6 cm (64\")   Wt 62.6 kg (138 lb)   SpO2 99%   BMI 23.69 kg/m²     Physical Exam   Constitutional: Pt is oriented to person, place, and in no distress.   Pulmonary/Chest: No distress.  No audible wheezes  Psychiatric: Mood, memory, affect and judgment appear normal.     Assessment & Plan     Diagnosis:  GERD    Anticipated Surgical Procedure:  Endoscopy    The risks, benefits, and alternatives of endoscopy were reviewed with the patient today.  Risks including perforation, with or without dilation, possibly requiring surgery.  Additional risks include risk of bleeding.  There is also the risk of a drug reaction or problems with anesthesia.  This will be discussed with the further by the anesthesia team on the day of the procedure. The benefits include the diagnosis and management of disease of the upper digestive tract.  Alternatives to endoscopy include upper GI series, laboratory testing, radiographic evaluation, or no intervention.  The patient verbalizes understanding and agrees.    Please note that portions of this note were completed with a voice recognition program.       "

## 2024-02-01 ENCOUNTER — OFFICE VISIT (OUTPATIENT)
Dept: GASTROENTEROLOGY | Facility: CLINIC | Age: 69
End: 2024-02-01
Payer: COMMERCIAL

## 2024-02-01 VITALS
SYSTOLIC BLOOD PRESSURE: 108 MMHG | OXYGEN SATURATION: 97 % | HEIGHT: 64 IN | BODY MASS INDEX: 23.39 KG/M2 | WEIGHT: 137 LBS | TEMPERATURE: 97.4 F | DIASTOLIC BLOOD PRESSURE: 68 MMHG | HEART RATE: 88 BPM

## 2024-02-01 DIAGNOSIS — K21.9 GASTROESOPHAGEAL REFLUX DISEASE WITHOUT ESOPHAGITIS: ICD-10-CM

## 2024-02-01 DIAGNOSIS — K59.00 CONSTIPATION, UNSPECIFIED CONSTIPATION TYPE: ICD-10-CM

## 2024-02-01 DIAGNOSIS — Z83.719 FAMILY HISTORY OF POLYPS IN THE COLON: Primary | ICD-10-CM

## 2024-02-01 RX ORDER — POLYETHYLENE GLYCOL 3350 17 G/17G
17 POWDER, FOR SOLUTION ORAL DAILY PRN
COMMUNITY

## 2024-02-01 NOTE — PROGRESS NOTES
"Primary Physician: Sebastián Batista DO    Chief Complaint   Patient presents with    Follow-up     Pt presents today for GERD and constipation follow up-had endo 12/7/2023; Pt states she has stopped her Mobic and thinks that has helped her reflux but she does have \"spells\" of reflux; Pt also states she stopped her Linzess because she couldn't tell it was helping and was having incontinence issues-has gone back to taking Miralax almost daily and states she is \"still working on regulating everything\"-is trying to find the right dosage        Subjective     Jenni Ahn is a 68 y.o. female.    HPI  Constipation follow-up   Patient has had constipation issues for greater than 10 years.  She had tried MiraLAX in the past but that caused her to lose control of her bowel pattern.  Had been on Linzess in the past but stopped it, therefore the last time I saw her on 9/11/2023 we decided to start Linzess back.  She did not feel like the Linzess was helping therefore she went back to MiraLAX.  At this point she states the MiraLAX seems to be helping more than anything.  She still trying to find exactly how much is the right dose for her.  Taking 1 dose daily for the most part now.    Also problematic is an increase in severity of bladder incontinence.  This has become a real issue for her.  He has an appointment with the physical therapist in [place] to see if they cannot help with this.    Last colonoscopy July 2020: Entire examined colon normal. 5 year recall.      Surgical Hx includes appy & total hyst. I have concern for pelvic floor dysfunction.    GERD  Overall Jenni is acid reflux is under control.  She has stopped her Mobic.  Continues to take Nexium. Does not have any real upper GI complaints to mention today.    12/7/2023 Endoscopy: medium sized HH, no evidence of mary's normal stomach and duodenum.    Pt reports she had bone density study around Nov 2023 through her pcp and pt reports it was stable.      Family " Hx Colon Polyps  Mother (age unknown) and Brother (>60's) had colon polyps    Past Medical History:   Diagnosis Date    Allergic     Anxiety     Depression     Elevated cholesterol     Endometriosis     Family history of colonic polyps     Female infertility     GERD (gastroesophageal reflux disease)     Herpes     History of skin cancer     HPV (human papilloma virus) infection     (See  records) From  During 5 yr. marriage.    Hypertension     IBS (irritable bowel syndrome)     Kidney stone     PONV (postoperative nausea and vomiting)     Sinusitis     Urinary tract infection     Varicella ??       Past Surgical History:   Procedure Laterality Date    APPENDECTOMY      BREAUX PROCEDURE  07/29/2015    BREAST BIOPSY  2001    x 2    CATARACT EXTRACTION  2020    COLONOSCOPY  05/14/2015    Normal; Repeat 5 years    COLONOSCOPY  08/17/2007    Normal; Repeat 7 years    COLONOSCOPY N/A 07/17/2020    The entire examined colon is normal on direct and retroflexion views; No specimens collected; Repeat 5 years    DIAGNOSTIC LAPAROSCOPY EXPLORATORY LAPAROTOMY      For endometriosis    ENDOSCOPY  07/29/2015    Medium-sized HH; Normal stomach; Normal examined duodenum; BRAVO pH capsule deployed; No specimens collected    ENDOSCOPY  05/14/2015    HH; Schatzki's ring-dilated to 20mm; LA grade B esophagitis    ENDOSCOPY N/A 12/07/2023    Medium-sized HH; There is no endoscopic evidence of Fonseca's esophagus; Normal stomach; Normal examined duodenum; No specimens collected    HYSTERECTOMY      TONSILLECTOMY      TOTAL ABDOMINAL HYSTERECTOMY WITH SALPINGO OOPHORECTOMY  1998    Dr. Aldana    WISDOM TOOTH EXTRACTION  [id number]        Current Outpatient Medications:     amLODIPine (NORVASC) 10 MG tablet, Take 1 tablet by mouth Daily., Disp: , Rfl:     Azelastine HCl 137 MCG/SPRAY solution, 2 sprays into the nostril(s) as directed by provider As Needed., Disp: , Rfl:     busPIRone (BUSPAR) 7.5 MG tablet, Take 1 tablet  by mouth As Needed., Disp: , Rfl:     desvenlafaxine (PRISTIQ) 100 MG 24 hr tablet, Take 1 tablet by mouth Daily., Disp: , Rfl:     esomeprazole (nexIUM) 40 MG capsule, Take 1 capsule by mouth every morning (before breakfast), Disp: , Rfl:     fluticasone (FLONASE) 50 MCG/ACT nasal spray, 1 spray into the nostril(s) as directed by provider As Needed., Disp: , Rfl:     gabapentin (NEURONTIN) 300 MG capsule, Take 1 capsule by mouth 2 (Two) Times a Day., Disp: , Rfl:     levocetirizine (XYZAL) 5 MG tablet, Take 1 tablet by mouth every night at bedtime., Disp: , Rfl:     losartan-hydrochlorothiazide (HYZAAR) 50-12.5 MG per tablet, Take 1 tablet by mouth Daily., Disp: , Rfl:     polyethylene glycol (MiraLax) 17 g packet, Take 17 g by mouth Daily As Needed., Disp: , Rfl:     Repatha solution prefilled syringe injection, Inject 1 mL under the skin into the appropriate area as directed Every 14 (Fourteen) Days., Disp: , Rfl:     vitamin D (ERGOCALCIFEROL) 1.25 MG (72828 UT) capsule capsule, Take 1 capsule by mouth 1 (One) Time Per Week., Disp: , Rfl:     No Known Allergies    Social History     Socioeconomic History    Marital status:    Tobacco Use    Smoking status: Never    Smokeless tobacco: Never   Vaping Use    Vaping Use: Never used   Substance and Sexual Activity    Alcohol use: Yes     Comment: Occasionally-Mixed drink once in a while. Once or twice a month.    Drug use: Never    Sexual activity: Not Currently     Partners: Male     Birth control/protection: Surgical     Comment: Hysterectomy       Family History   Problem Relation Age of Onset    Breast cancer Mother     Colon polyps Mother         Unknown age    Hypertension Mother     Stroke Father     Prostate cancer Brother     Diabetes Brother     Colon polyps Brother         > 60 years of age    Stroke Paternal Grandfather         I think    Colon cancer Neg Hx     Esophageal cancer Neg Hx     Liver cancer Neg Hx     Liver disease Neg Hx     Rectal  "cancer Neg Hx     Stomach cancer Neg Hx        Review of Systems    Objective     /68 (BP Location: Left arm, Patient Position: Sitting, Cuff Size: Adult)   Pulse 88   Temp 97.4 °F (36.3 °C) (Infrared)   Ht 162.6 cm (64\")   Wt 62.1 kg (137 lb)   SpO2 97%   Breastfeeding No   BMI 23.52 kg/m²     Physical Exam    Lab Results - Last 18 Months   Lab Units 12/05/23  0856   HEMOGLOBIN g/dL 13.7   HEMATOCRIT % 43.6   MCV fL 86.5   WBC 10*3/mm3 7.99   RDW % 13.3   MPV fL 10.5   PLATELETS 10*3/mm3 325       Lab Results - Last 18 Months   Lab Units 12/05/23  0856   T4 TOTAL mcg/dL 10.20   TSH uIU/mL 3.820            IMPRESSION/PLAN:    Assessment & Plan      Problem List Items Addressed This Visit       Family history of polyps in the colon - Primary    Overview     Brother had polyps age greater than 60, mother had polyps age unknown  Last colonoscopy July 2020 unremarkable.         Current Assessment & Plan     Recall colonoscopy July 2025         Constipation    Overview     Constipation associated with abdominal bloating and fullness, that has gotten better with her daily MiraLAX therapy.  Tried Linzess in the past but stopped that therapy.  At this point taking MiraLAX nearly daily with decent results.         Gastroesophageal reflux disease without esophagitis    Overview     Reflux under control with daily Nexium.  She has stopped her Mobic.     Endoscopy 12/2023 shows medium size hiatal hernia.  No esophagitis or Fonseca's.  48-hour Bravo capsule on Nexium 40 mg daily in 7/2015 showed complete acid suppression.  No need to escalate treatment.  Suspect that there is some role of delayed gastric emptying contributing to symptomatology.  Little to offer in this regard other than smaller more frequent meals.          Follow-up yearly                Cori Cedillo, APRN  02/01/24  09:50 CST    Part of this note may be an electronic transcription/translation of spoken language to printed text.      "

## 2024-03-27 ENCOUNTER — TRANSCRIBE ORDERS (OUTPATIENT)
Dept: ADMINISTRATIVE | Facility: HOSPITAL | Age: 69
End: 2024-03-27
Payer: COMMERCIAL

## 2024-03-27 ENCOUNTER — HOSPITAL ENCOUNTER (OUTPATIENT)
Dept: GENERAL RADIOLOGY | Facility: HOSPITAL | Age: 69
Discharge: HOME | End: 2024-03-27
Admitting: INTERNAL MEDICINE
Payer: COMMERCIAL

## 2024-03-27 DIAGNOSIS — J45.40 MODERATE PERSISTENT ASTHMATIC BRONCHITIS WITHOUT COMPLICATION: Primary | ICD-10-CM

## 2024-03-27 DIAGNOSIS — J45.40 MODERATE PERSISTENT ASTHMATIC BRONCHITIS WITHOUT COMPLICATION: ICD-10-CM

## 2024-03-27 PROCEDURE — 71046 X-RAY EXAM CHEST 2 VIEWS: CPT

## 2024-04-26 RX ORDER — MONTELUKAST SODIUM 10 MG/1
1 TABLET ORAL DAILY
COMMUNITY
Start: 2024-04-15

## 2024-04-26 RX ORDER — BUDESONIDE AND FORMOTEROL FUMARATE DIHYDRATE 160; 4.5 UG/1; UG/1
2 AEROSOL RESPIRATORY (INHALATION) EVERY 12 HOURS SCHEDULED
COMMUNITY
Start: 2024-03-26

## 2024-04-26 RX ORDER — ALBUTEROL SULFATE 2.5 MG/3ML
2.5 SOLUTION RESPIRATORY (INHALATION) EVERY 4 HOURS PRN
COMMUNITY
Start: 2024-03-26

## 2024-05-09 NOTE — PROGRESS NOTES
RUDY Pinzon  [facility]   Pulmonary and Critical Care  [address] [place] [address]  Phone: [phone]  Fax: [phone]           Chief Complaint  Asthma    Subjective    History of Present Illness     Jenni Ahn presents to [facility] PULMONARY & CRITICAL CARE MEDICINE   History of Present Illness  Ms. Ahn is a 68-year-old female patient referred by PCP Dr. Batista for moderate persistent asthma.  She also has known history of allergies, anxiety, depression, vitamin D deficiency, GERD, history of skin cancer, hypertension, sinusitis. She has family history of mother with breast cancer, colon polyps, hypertension.  Father with stroke & skin cancers. Brother with colon polyps, diabetes and prostate cancer.  Paternal grandfather with stroke. Maternal grandfather cancer. She also notes her brother and mother had asthma. She is a never smoker, denies vape or e-cigarette use. She had second hand smoking growing up. She is a book keeper/ . No known exposures. She was recently started on Singulair and finds unsure benefit.  She was also provided cough syrup for which she finds a little benefit. She does not take consistently. She was started on Flonase and has quit taking this. She has been on azelastine nasal spray and quit taking this.  She takes an antihistamine cetirizine and quit taking it. She takes Nexium for her GERD and finds some benefit.  She does eat or drinking 2 to 3 hours before bedtime. She was recently started on Symbicort and found uncertain benefit but developed thrush and sores. She notes she was rinsing her mouth but admits maybe not well enough. She has albuterol nebulizer that she quit taking and started vomiting and may have had some benefit. She had a round of steroids end of March. Prior she had a rounds in Nov and Dec. She had Covid in Dec and prior she was sick for two weeks in Nov. She has had several rounds of  "antibiotics in the last year.  She had a chest x-ray at the end of March that showed no acute findings with chronic right apical pleural scarring noted.  She has not had any recent lab work. She has has been allergy tested in past when followed by Dr. Richards around year 2000. She thinks it was more dust/ dogs related allergies. She was diagnosed with asthma around 2013. She was seen by Dr. Marie in past as well.  She has two cats in the home. She has dyspnea that is worse with climbing stairs. She has a cough that is chronic, present on/ off for 10 years and dry.  She has postnasal drip that is constant for many years. She has known triggers to include eating. She some trouble with swallowing. She does have some post prandial reflux of food. She has some awakenings from coughing. Laying flat makes the cough worse. She is able to do her ADL but does get worn out from the coughing. Her last pulmonary function study was 2013. She had swallow study in 2013 with dysmotility. Saw Dr. Gonzalez with Gi at that time and had EGD. Placed on Nexium twice daily. She then had a Bravo capsule. She seemed to have had improvement. She also went to the [facility] back in 2013. She has had no ER/UC/hospitalizations in the last year related to her asthma. She has had visits with her PCP however.        Objective   Vital Signs:   /72   Pulse 81   Ht 162.6 cm (64\")   Wt 63 kg (139 lb)   SpO2 97% Comment: RA  BMI 23.86 kg/m²     Physical Exam  Vitals reviewed.   Constitutional:       Appearance: Normal appearance.   Cardiovascular:      Rate and Rhythm: Normal rate and regular rhythm.   Pulmonary:      Effort: Pulmonary effort is normal.      Breath sounds: Normal breath sounds.   Neurological:      General: No focal deficit present.      Mental Status: She is alert and oriented to person, place, and time.   Psychiatric:         Mood and Affect: Mood normal.         Behavior: Behavior normal.          Result Review :  The " following data was reviewed by: RUDY Pinzon on 05/14/2024:    Data reviewed : Radiologic studies chest x-ray    My interpretation of imaging: As in HPI  My interpretation of labs: None  XR Chest PA & Lateral (03/27/2024 16:26)     My interpretation of the PFT : None    No results found for this or any previous visit.          Assessment and Plan   Diagnoses and all orders for this visit:    1. Moderate persistent asthma without complication (Primary)  -     IgE + Allergens (22)  -     CBC & Differential  -     Complete PFT - Pre & Post Bronchodilator; Future    2. Chronic cough  -     IgE + Allergens (22)  -     CBC & Differential  -     Complete PFT - Pre & Post Bronchodilator; Future    Other orders  -     Albuterol-Budesonide (Airsupra) 90-80 MCG/ACT aerosol; Inhale 2 puffs As Needed (shortness of breath).  Dispense: 10.7 g; Refill: 1  -     levocetirizine (XYZAL) 5 MG tablet; Take 1 tablet by mouth every night at bedtime.  Dispense: 30 tablet; Refill: 3    Patient provided the following instructions/plan:   Avoid eating or drinking 2-3 hours prior to bedtime and follow GERD diet. Hand out provided  Resume Flonase and Azelastine nasal sprays  Stop Singulair  Resume Xyzal and script sent to pharmacy  Begin Airsupra as needed rescue inhaler that does contain inhaled steroid so remember to rinse your mouth out after use  You will be contacted to schedule a complete pulmonary function study to be done at the hospital   Your lab will be drawn today to include CBC with Diff and IgE+22 allergens    We discussed potential for referral back to GI however she mentioned she just had a recent EGD. Review of records shows this was done in Dec with no evidence of Fonseca's esophagus. She was recommend to follow gird diet and stay on Nexium. We also discussed the potential to be referred back to the Asthma/ allergy office and/or ENT. We will discussed this further after the above plan and findings.     Time spent  with face to face with patient was 55 minutes. This was spent reviewing records, history, discussing results of testing, ordering of studies/labs.     Alpha 1: tested today   LDCT: Never smoker  Smoking Cessation: Never smoker  Vaccinations: Flu: Due in fall PNA: Completed         Follow Up   No follow-ups on file.  Patient was given instructions and counseling regarding her condition or for health maintenance advice. Please see specific information pulled into the AVS if appropriate.     Darling Miles, RUDY  5/14/2024  11:00 CDT

## 2024-05-14 ENCOUNTER — OFFICE VISIT (OUTPATIENT)
Dept: PULMONOLOGY | Facility: CLINIC | Age: 69
End: 2024-05-14
Payer: COMMERCIAL

## 2024-05-14 VITALS
DIASTOLIC BLOOD PRESSURE: 72 MMHG | OXYGEN SATURATION: 97 % | HEART RATE: 81 BPM | WEIGHT: 139 LBS | BODY MASS INDEX: 23.73 KG/M2 | SYSTOLIC BLOOD PRESSURE: 122 MMHG | HEIGHT: 64 IN

## 2024-05-14 DIAGNOSIS — R05.3 CHRONIC COUGH: ICD-10-CM

## 2024-05-14 DIAGNOSIS — J45.40 MODERATE PERSISTENT ASTHMA WITHOUT COMPLICATION: Primary | ICD-10-CM

## 2024-05-14 PROCEDURE — 99215 OFFICE O/P EST HI 40 MIN: CPT | Performed by: NURSE PRACTITIONER

## 2024-05-14 RX ORDER — LEVOCETIRIZINE DIHYDROCHLORIDE 5 MG/1
5 TABLET, FILM COATED ORAL
Qty: 30 TABLET | Refills: 3 | Status: SHIPPED | OUTPATIENT
Start: 2024-05-14

## 2024-05-14 RX ORDER — ALBUTEROL SULFATE AND BUDESONIDE 90; 80 UG/1; UG/1
2 AEROSOL, METERED RESPIRATORY (INHALATION) AS NEEDED
Qty: 10.7 G | Refills: 1 | Status: SHIPPED | OUTPATIENT
Start: 2024-05-14

## 2024-05-14 NOTE — PATIENT INSTRUCTIONS
Avoid eating or drinking 2-3 hours prior to bedtime and follow GERD diet. Hand out provided  Resume Flonase and Azelastine nasal sprays  Stop Singulair  Resume Xyzal and script sent to pharmacy  Begin Airsupra as needed rescue inhaler that does contain inhaled steroid so remember to rinse your mouth out after use  You will be contacted to schedule a complete pulmonary function study to be done at the hospital   Your lab will be drawn today to include CBC with Diff and IgE+22 allergens

## 2024-05-21 LAB
A ALTERNATA IGE QN: <0.1 KU/L
A FUMIGATUS IGE QN: <0.1 KU/L
BASOPHILS # BLD AUTO: 0.1 X10E3/UL (ref 0–0.2)
BASOPHILS NFR BLD AUTO: 1 %
BERMUDA GRASS IGE QN: <0.1 KU/L
BOXELDER IGE QN: NORMAL KU/L
C HERBARUM IGE QN: <0.1 KU/L
CAT DANDER IGE QN: <0.1 KU/L
COMMON RAGWEED IGE QN: <0.1 KU/L
CONV CLASS DESCRIPTION: NORMAL
COTTONWOOD IGE QN: NORMAL KU/L
D FARINAE IGE QN: <0.1 KU/L
D PTERONYSS IGE QN: <0.1 KU/L
DOG DANDER IGE QN: <0.1 KU/L
EOSINOPHIL # BLD AUTO: 0.4 X10E3/UL (ref 0–0.4)
EOSINOPHIL NFR BLD AUTO: 6 %
ERYTHROCYTE [DISTWIDTH] IN BLOOD BY AUTOMATED COUNT: 13 % (ref 11.7–15.4)
HCT VFR BLD AUTO: 40.4 % (ref 34–46.6)
HGB BLD-MCNC: 13 G/DL (ref 11.1–15.9)
IGE SERPL-ACNC: 22 IU/ML (ref 6–495)
IMM GRANULOCYTES # BLD AUTO: 0 X10E3/UL (ref 0–0.1)
IMM GRANULOCYTES NFR BLD AUTO: 0 %
LYMPHOCYTES # BLD AUTO: 2.3 X10E3/UL (ref 0.7–3.1)
LYMPHOCYTES NFR BLD AUTO: 34 %
MCH RBC QN AUTO: 27.9 PG (ref 26.6–33)
MCHC RBC AUTO-ENTMCNC: 32.2 G/DL (ref 31.5–35.7)
MCV RBC AUTO: 87 FL (ref 79–97)
MONOCYTES # BLD AUTO: 0.6 X10E3/UL (ref 0.1–0.9)
MONOCYTES NFR BLD AUTO: 9 %
MT JUNIPER IGE QN: NORMAL KU/L
NETTLE IGE QN: NORMAL KU/L
NEUTROPHILS # BLD AUTO: 3.3 X10E3/UL (ref 1.4–7)
NEUTROPHILS NFR BLD AUTO: 50 %
P NOTATUM IGE QN: NORMAL KU/L
PLATELET # BLD AUTO: 342 X10E3/UL (ref 150–450)
RBC # BLD AUTO: 4.66 X10E6/UL (ref 3.77–5.28)
REQUEST PROBLEM: NORMAL
ROACH IGE QN: <0.1 KU/L
SALTWORT IGE QN: NORMAL KU/L
SHEEP SORREL IGE QN: NORMAL KU/L
TIMOTHY IGE QN: <0.1 KU/L
WBC # BLD AUTO: 6.5 X10E3/UL (ref 3.4–10.8)
WHITE ASH IGE QN: NORMAL KU/L
WHITE ELM IGE QN: NORMAL KU/L
WHITE MULBERRY IGE QN: NORMAL KU/L
WHITE OAK IGE QN: NORMAL KU/L

## 2024-05-29 ENCOUNTER — TELEPHONE (OUTPATIENT)
Dept: PULMONOLOGY | Facility: CLINIC | Age: 69
End: 2024-05-29
Payer: COMMERCIAL

## 2024-05-29 NOTE — TELEPHONE ENCOUNTER
Darling Miles APRN Walquist, Emily, MA  Please let patient know we did receive a copy of her recent CT scan as ordered by her PCP. She has several nodules up to 6mm in size. Recommendations are for a follow up CT in 3-6 months. If PCP has not ordered we will plan to order at her follow up in June.

## 2024-05-29 NOTE — TELEPHONE ENCOUNTER
Patient states she had been using both but now she is only using the flonase because she got a sore in her nose. Patient has been taking xyzal. She is taking nexium and avoids eating before bed. She uses the airsupra sometimes after her coughing fits, she can't tell much of a difference when she uses it. Patient does not think she is having enough sputum to get a culture.

## 2024-05-29 NOTE — TELEPHONE ENCOUNTER
Make sure she resume the azelastine and Flonase nasal sprays as asked.  Make sure she started the Xyzal.  Make sure she continued her Nexium and is avoiding eating or drinking 2 to 3 hours prior to bedtime.  Has she use the air supra and if so has that helped at all [facility] definitely keep her follow-up appointment and pulmonary function study appointment.  We could obtain a sputum culture just make sure she understands that it has to be the thick gunky stuff that she is coughing up out of her lungs and not saliva.  If she is agreeable for sputum culture I will place the order for her to take it to [facility] lab.

## 2024-05-29 NOTE — TELEPHONE ENCOUNTER
Darling Miles APRN Walquist, Emily, MA  Caller: Unspecified (Today,  3:43 PM)  Let's keep her follow up and PFT to see what that shows   Earned income

## 2024-05-29 NOTE — TELEPHONE ENCOUNTER
Hub staff attempted to follow warm transfer process and was unsuccessful     Caller: Jenni Ahn    Relationship to patient: Self    Best call back number: [phone]     Patient is needing: THE PT WAS RETURNING A CALL TO [facility]. PLEASE ADVISE.

## 2024-05-29 NOTE — TELEPHONE ENCOUNTER
Called and let patient know. She voiced understanding and she would like  to order it at her follow up. Patient does say that her cough has still been very bad and had drainage last week. She was on a BP med that had the possibility of causing a cough, so her dr put her on a different one. She has since quit that one as well. The cough has been around since about November. The coughing fits are so bad that she is not able to get up and do anything. She has been coughing clear drainage up. No fever, but she has had a runny nose.

## 2024-05-31 DIAGNOSIS — J45.40 MODERATE PERSISTENT ASTHMA WITHOUT COMPLICATION: ICD-10-CM

## 2024-06-06 RX ORDER — IRBESARTAN AND HYDROCHLOROTHIAZIDE 150; 12.5 MG/1; MG/1
1 TABLET, FILM COATED ORAL DAILY
COMMUNITY
Start: 2024-05-15

## 2024-07-30 NOTE — PROGRESS NOTES
RUDY Pinzon  [facility]   Pulmonary and Critical Care  [address]  [place] [address]  Phone: [phone]  Fax: [phone]           Chief Complaint  Moderate persistent asthma without complication    Subjective    History of Present Illness     Jenni Ahn presents to [facility] PULMONARY & CRITICAL CARE MEDICINE   History of Present Illness  Ms. Ahn is a 68-year-old female patient referred at last by PCP Dr. Batista for moderate persistent asthma. She also has known history of allergies, esophageal dysmotility, anxiety, depression, vitamin D deficiency, GERD, history of skin cancer, hypertension, sinusitis. Family history of asthma.  She was stopped on Singulair due to no benefit. She was resumed on Flonase, azelastine nasal spray and Xyzal. Today she notes unsure benefit. She takes Nexium for her GERD and finds some benefit. She has started taking Tums as well. She was asked to not eat/ drink 2-3 hours prior to bedtime and notes she has not tried very hard to avoid this. She was given a prescription for air supra and notes maybe some benefit.  She is using the Airsupra not much in the last week but prior pretty often as much as 2-3 times a day. She continues to feel her cough and wheezing are more in her throat then chest. She has had 4 rounds of steroids and antibiotics in the last year. Steroids causes hairloss for her.  She has dyspnea that is worse with climbing stairs. She has a cough that is chronic, present on/ off for 10 years and dry. She has postnasal drip that is constant for many years. Eating and swallowing makes her cough worse.  She has some nighttime awakenings from coughing but may be improved. Laying flat makes the cough worse. She has had no ER/UC/hospitalizations in the last year related to her asthma. Her IgE +22 allergens was insufficient to run the complete test however what was completed was normal.  Her CBC showed absolute  "eosinophils of 400.  Alpha-1 was normal.  Her pulmonary function study today showed Normal with flattened IC. She stopped her BP meds on her own and monitoring her BP and it has been stable.               Objective   Vital Signs:   /70   Pulse 78   Ht 162.6 cm (64\")   Wt 61.6 kg (135 lb 12.8 oz)   SpO2 94% Comment: RA  BMI 23.31 kg/m²     Physical Exam  Vitals reviewed.   Constitutional:       Appearance: Normal appearance.   Cardiovascular:      Rate and Rhythm: Normal rate and regular rhythm.   Pulmonary:      Effort: Pulmonary effort is normal.      Breath sounds: Normal breath sounds.   Neurological:      General: No focal deficit present.      Mental Status: She is alert and oriented to person, place, and time.   Psychiatric:         Mood and Affect: Mood normal.         Behavior: Behavior normal.          Result Review :  The following data was reviewed by: RUDY Pinzon on 07/31/2024:  Common labs          12/5/2023    08:56 5/14/2024    10:18   Common Labs   WBC 7.99  6.5    Hemoglobin 13.7  13.0    Hematocrit 43.6  40.4    Platelets 325  342        My interpretation of imaging: No new  My interpretation of labs: As in HPI      My interpretation of the PFT : As below    Results for orders placed during the hospital encounter of 07/31/24    Complete PFT - Pre & Post Bronchodilator    Narrative  [facility] - Pulmonary Function Test    [address] [place] [address]  [id number]  [phone]    Patient : Jenni Ahn  MRN : [id number]  CSN : [id number]  Pulmonologist : Juancarlos Hansen MD  Date : 7/31/2024    ______________________________________________________________________    Interpretation :  1.  Spirometry reveals a mild decrease in peak expiratory flow, and otherwise is within normal limits.  2.  There is improvement in peak expiratory flow postbronchodilator.  Postbronchodilator spirometry is within normal limits.  3.  Lung volumes reveal an elevated expiratory " reserve volume and otherwise are within normal limits.  4.  There is a mild diffusion impairment which when corrected for alveolar volume is a low normal diffusion capacity.  5.  There is some flattening of the inspiratory limb of the flow-volume loop which could be seen with a variable extrathoracic upper airway obstruction, clinical correlation is advised.      Juancarlos Hansen MD            Assessment and Plan   Diagnoses and all orders for this visit:    1. Mild intermittent asthma without complication (Primary)    2. Chronic cough    3. Multiple lung nodules    4. Eosinophils increased      Patient has tried Symbicort in the recent past however despite rinsing her mouth she had sores.  She was provided air supra for which she finds some benefit.  She has had raspy and hoarseness since December.  She is constantly using cough drops.  She continues to feel like her cough and wheezing are initiating in her throat and not her chest.  She had her pulmonary function study done today with essentially normal spirometry and no significant postbronchodilator response.  She has a history of allergies treated with allergy shots in the past by Dr. Richards.  She had a flattened inspiratory curve on her PFT today.  She will continue her air supra at this time.  She also has a history of acid reflux and continues to have symptoms despite being on Nexium.  She notes that she has not been very good about decreasing her oral intake prior to bedtime and will work on that.  Her last CT showed multiple lung nodules 6 mm or less and we will plan a follow-up CT scan in November.  This will be 6 months from her May CT.  She does have elevated eosinophils of 400.  We discussed the possibility of eosinophilic esophagitis as well.  She is agreeable to be referred to ENT for evaluation and workup of the flattened inspiratory curve, allergies, chronic cough and acid reflux.  Again I have asked her to continue the air supra and we will  follow-up with her in 3 months.        Alpha 1: Normal, MM  LDCT: never smoker   Smoking Cessation: never smoker   Vaccinations: Flu: due fall PNA: completed           Follow Up   Return in about 3 months (around 10/31/2024) for CT.  Patient was given instructions and counseling regarding her condition or for health maintenance advice. Please see specific information pulled into the AVS if appropriate.     Darling Miles, RUDY  7/31/2024  15:54 CDT

## 2024-07-31 ENCOUNTER — HOSPITAL ENCOUNTER (OUTPATIENT)
Dept: PULMONOLOGY | Facility: HOSPITAL | Age: 69
Discharge: HOME | End: 2024-07-31
Admitting: NURSE PRACTITIONER
Payer: COMMERCIAL

## 2024-07-31 ENCOUNTER — OFFICE VISIT (OUTPATIENT)
Dept: PULMONOLOGY | Facility: CLINIC | Age: 69
End: 2024-07-31
Payer: COMMERCIAL

## 2024-07-31 VITALS
OXYGEN SATURATION: 94 % | HEIGHT: 64 IN | WEIGHT: 135.8 LBS | BODY MASS INDEX: 23.18 KG/M2 | DIASTOLIC BLOOD PRESSURE: 70 MMHG | HEART RATE: 78 BPM | SYSTOLIC BLOOD PRESSURE: 124 MMHG

## 2024-07-31 DIAGNOSIS — R94.2 ABNORMAL PFT: ICD-10-CM

## 2024-07-31 DIAGNOSIS — J45.40 MODERATE PERSISTENT ASTHMA WITHOUT COMPLICATION: ICD-10-CM

## 2024-07-31 DIAGNOSIS — R89.8 EOSINOPHILS INCREASED: ICD-10-CM

## 2024-07-31 DIAGNOSIS — R05.3 CHRONIC COUGH: ICD-10-CM

## 2024-07-31 DIAGNOSIS — R91.8 MULTIPLE LUNG NODULES: ICD-10-CM

## 2024-07-31 DIAGNOSIS — J30.2 SEASONAL ALLERGIES: ICD-10-CM

## 2024-07-31 DIAGNOSIS — J45.20 MILD INTERMITTENT ASTHMA WITHOUT COMPLICATION: Primary | Chronic | ICD-10-CM

## 2024-07-31 DIAGNOSIS — R05.3 CHRONIC COUGH: Chronic | ICD-10-CM

## 2024-07-31 PROCEDURE — 94060 EVALUATION OF WHEEZING: CPT

## 2024-07-31 PROCEDURE — 94726 PLETHYSMOGRAPHY LUNG VOLUMES: CPT

## 2024-07-31 PROCEDURE — 99214 OFFICE O/P EST MOD 30 MIN: CPT | Performed by: NURSE PRACTITIONER

## 2024-07-31 PROCEDURE — 94729 DIFFUSING CAPACITY: CPT

## 2024-07-31 RX ORDER — ALBUTEROL SULFATE 2.5 MG/3ML
2.5 SOLUTION RESPIRATORY (INHALATION) ONCE
Status: COMPLETED | OUTPATIENT
Start: 2024-07-31 | End: 2024-07-31

## 2024-07-31 RX ORDER — PROMETHAZINE HYDROCHLORIDE AND CODEINE PHOSPHATE 6.25; 1 MG/5ML; MG/5ML
SOLUTION ORAL EVERY 4 HOURS PRN
COMMUNITY
Start: 2024-07-12

## 2024-07-31 RX ADMIN — ALBUTEROL SULFATE 2.5 MG: 2.5 SOLUTION RESPIRATORY (INHALATION) at 09:13

## 2024-08-15 ENCOUNTER — OFFICE VISIT (OUTPATIENT)
Dept: OTOLARYNGOLOGY | Facility: CLINIC | Age: 69
End: 2024-08-15
Payer: COMMERCIAL

## 2024-08-15 ENCOUNTER — LAB (OUTPATIENT)
Dept: LAB | Facility: HOSPITAL | Age: 69
End: 2024-08-15
Payer: COMMERCIAL

## 2024-08-15 VITALS
TEMPERATURE: 97.7 F | BODY MASS INDEX: 23.46 KG/M2 | HEIGHT: 64 IN | HEART RATE: 87 BPM | DIASTOLIC BLOOD PRESSURE: 87 MMHG | WEIGHT: 137.4 LBS | SYSTOLIC BLOOD PRESSURE: 147 MMHG

## 2024-08-15 DIAGNOSIS — Z91.09 ENVIRONMENTAL ALLERGIES: ICD-10-CM

## 2024-08-15 DIAGNOSIS — R05.3 CHRONIC COUGH: Primary | ICD-10-CM

## 2024-08-15 DIAGNOSIS — R49.0 HOARSENESS OF VOICE: ICD-10-CM

## 2024-08-15 DIAGNOSIS — R05.3 CHRONIC COUGH: ICD-10-CM

## 2024-08-15 DIAGNOSIS — J45.20 MILD INTERMITTENT ASTHMA WITHOUT COMPLICATION: Chronic | ICD-10-CM

## 2024-08-15 PROCEDURE — 86003 ALLG SPEC IGE CRUDE XTRC EA: CPT

## 2024-08-15 PROCEDURE — 36415 COLL VENOUS BLD VENIPUNCTURE: CPT

## 2024-08-15 NOTE — PATIENT INSTRUCTIONS
"THE PROBLEM OF ACID REFLUX    In BOTH children and adults, irritating acids may leak from the stomach and come up into the esophagus and throat. This can occur at any time, but occurs more commonly when lying down. When the acid touches the lining of the esophagus, there is irritation and muscle spasm, which can be felt up into the throat. Usually this is felt as \"heartburn\". When scarring of the esophagus occurs, the esophagus becomes less sensitive and the symptoms may only be in the throat.     Some of the symptoms that can occur with acid reflux into the throat include coughing, soreness, burning, hoarseness, throat clearing, excessive mucous, bad taste in the mouth, bad breath, a sensation of a lump in the throat, wheezing, post-nasal drip, choking spells, bleeding and nausea. In a small percentage of people, more serious problems may result, such as pneumonia, ulcers in the larynx (voice box), reduced mobility of the vocal cords and a pouch in the upper esophagus (diverticulum). In children, the symptoms may be different and include persistent vomiting, bleeding from the esophagus, respiratory problems, pneumonia, asthma, choking spells, swallowing problems and anemia. In some cases, unexplained fussiness and crying is due to reflux.     The following instructions are designed to help neutralize the stomach acid, reduce the production of the acid, promote emptying of the acid from the stomach into the intestines and prevent acid from coming up into the esophagus. You should adopt enough of these changes to alleviate your symptoms. If carrying out these suggestions produces no change, it is imperative that you return so that we can discuss further the problem. More testing may be required, as might medication. It is important to realize that the esophagus takes time to heal, so you should not expect results immediately.    Diet  Most often, the throat symptoms above are due to dietary abuses. Certain foods are " known to cause irritation, because they are acids themselves or cause excess production of stomach acid. These should be avoided, if possible. The following is a partial list of foods recognized as troublesome: coffee (even decaffeinated), tea chocolate, cola beverages, citrus beverages (such as 7-Up), caffeine containing beverages, alcohol, citrus fruits and juices, highly spiced foods, fatty foods, candies, nuts, mints, milk and milk products. Some of the worst offenders for promoting stomach acid are milk and beer.     Hard candies, gum, breath fresheners, throat lozenges, cough drops, mouthwashes, gargles, may actually irritate the throat directly (many cough drops and lozenges contain irritants such as oil of eucalyptus and menthol), and can also stimulate acid production directly.     Large amounts of liquid in the diet tend to promote reflux, because liquids tend to come back up more easily than solids.     Meals should be bland and consist of real food, trying to avoid recreational food. Multiple small meals, rather than one or two large meals helps prevent reflux by not stretching the stomach and increasing the time needed for digestion, as well increasing the amount of acid needed to digest the meal. If you are overweight, losing weight is tremendously helpful.     YOU SHOULD NOT EAT FOR AT LEAST TWO HOURS BEFORE RETIRING AND YOU SHOULD REMAIN SITTING OR STANDING FOR AT LEAST 45 MINUTES AFTER EACH MEAL. This promotes passage of food from the stomach into the intestine.    Posture  Body weight is a significant factor in promoting reflux. Losing weight is beneficial. Pregnancy will markedly increase the symptoms of heartburn and throat pain. You should avoid clothing that fits tightly across the mid-section, as this promotes squeezing of the abdominal contents upward. It is helpful to practice abdominal or diaphragmatic breathing, using the stomach to push out each breath rather than chest expansion. Avoid  slumping while sitting, and avoid stooping or straining.     For many, the reflux occurs at night while sleeping. This is the time acid production is the greatest and you are lying down, a position that promotes reflux, thus setting up the irritation that occurs the next morning. One of the most important things you can do is to elevate the head of the bed, in an effort to use gravity to keep the acid in the stomach. This is accomplished by placed blocks or bricks beneath the legs at the head of the bed, raising the head 6-10 inches. Do not make the head so high that you slide down. Pillows are not effective because they cause the body to curl, increasing abdominal pressure and it is difficult to remain upright on them. Additionally, pillows promote sleeping on the back, but it is far more desirable to sleep on the right side or on the stomach, as this allows gas to escape, while preventing the escape of acid material. Children and infants, who are refluxing, should sleep on their stomachs.  Exercise  Regular exercise is extremely beneficial in promoting good digestion and regularity. The abdominal muscles are toned, which aids in controlling acid problems. However, it is recommended that you not participate in vigorous activity immediately after eating. This causes pressure on an already full stomach, forcing acid up into the esophagus. Regular exercise is encouraged, prior to meals, or two hours after meals.  Antacids  Antacids should be used regularly, as they neutralize excess acid. Gelusil II, Mylanta II, Tums and Rolaids are popular brands. Gaviscon is not an antacid, but floats on top of the stomach acid, preventing esophageal irritation. Care should be used in administering large doses of antacids, especially in children. Many antacid preparations contain magnesium, which promotes frequent bowel movements, while antacids that contain aluminum can be constipating. Tums have a high calcium content that can be  used to some advantage in older women with reflux.     Antacid tablets are convenient, but the liquid form tends to work much more quickly. Also remember to check with your physician about interference with other medications. Antacids can slow the absorption of digitalis, Indocin, tetracyclines, fluorides and isoniazid from the intestines. Many medications require the presence of stomach acid to be absorbed.     Initially, antacids should be used 30 minutes after each meal, 2 hours after each meal and at bedtime. This maximizes the neutralization of the stomach acid. Antacids can be used more frequently if symptoms persist, but such extensive use needs to be reviewed with your physician.  Prescription Medications  If the above recommendations are not effectively resolving the symptoms, medications may be prescribed. These are usually in the form of acid production blockers, such as Tagamet, Zantac, Pepcid, or Axid or acid pump inhibitors like Prevacid, Nexium, Protonix or Prilosec. These drugs help stop acid production in the stomach. Medications such as Reglan can be used to promote stomach emptying.  Medications That Promote Reflux  Certain medications can promote acid reflux; either by relaxing the sphincter muscle that helps keeps stomach acid down, or increasing the acid production. These include progesterone (Provera, Ortho Novum, Ovral, other birth control pills), theophylline (Kevyn-Dur, etc.), anticholinergic (Donnatal, Scopolamine, Probanthine, Bentil, others), beta blockers (Inderal, Tenormin and Corgard), alpha blockers (Dibenzyline), dopamine, calcium channel blockers (Procardia, Cardizem, Isotin, Calan), aspirin, non-steroidal anti-inflammatory drugs (Motrin, Advil, Nuprin, Nalfon, Rufen, Indocin, Feldene, Clinoril and Tolectin). Vitamin C is an acid and can promote reflux. This is only a partial list and before stopping any prescription medication, you should check with your physician.    Goal  The  goal is to reduce the symptoms with the least number of lifestyle changes. A combination of the above suggestions is frequently prescribed to return you to normal as quickly as possible. However, this problem did not develop overnight and will not disappear rapidly. Therefore, developing a regimen will aid in controlling symptoms and keep you symptom free for a long period of time. Other alternatives exist, should these suggestions fail, and can be discussed with your physician.     To Summarize:  Watch your diet, avoid foods that cause acid reflux  Lose weight  Avoid tight fitting clothes  Adjust your posture, raise the head of the bed  Exercise regularly  Use antacids  Use prescription medication as directed  Avoid medications that promote reflux  Avoid behavior and eating habits that promote reflux of stomach acid     You are welcome to do a Google search on the topic of reflux and reflux diets. The internet has many useful resources.     Please remember, your success in controlling this condition depends on many factors including diet, exercise, medications and follow up. All are important and must be utilized to achieve success.      REFLUX MEDICATION USE:   Do Take: Nexium 30-60 mins before dinner  Famotidine (Pepcid) once daily      ANTACID USE:  yes  Use antacids 30 mins after every meal, 2 hours after every meal and at Bedtime  Use Gaviscon Extra (best), Tums, Rolaids, etc  Over the counter  Use 2 tsp or 2 tabs as the dose  Remember that some of these products contain Calcium or Aluminum. If you have dietary or medical issues, please inform physician

## 2024-08-15 NOTE — PROGRESS NOTES
RUDY Almanzar  [facility]  [address], [address]  [facility] [id number]  Fax [phone]  Phone [phone]      Visit Type: FOLLOW UP   Chief Complaint   Patient presents with    Cough     Has happened for episode but episode started in Dec    Abnormal PFT           HPI  Jenni Ahn is a 68 y.o. female who presents for evaluation of chronic cough.  Has been present for years but describes as variable in intensity.  States has been notably worse for the last 8 to 9 months.  Associated symptoms include hoarseness  She does feel like it has improved some since stopping losartan about 2 months ago but still ongoing.   States eating and swallowing, laying flat makes cough worse.     She has recently started taking Flonase and Astelin daily. She was on Singulair for a few weeks but stopped taking stating it did not seem to help.      She did have chest xray and ct scan, states she had small nodules noted but pulmonology does not believe this is causing cough, they are monitoring.   She does report grossly normal PFT but had some flattening of the inspiratory limb of flow-volume loop which could be seen with a variable upper airway obstruction per Dr. Hansen interpretation.     She has known environmental allergies years ago, took immunotherapy with Dr. Richards in early 2000's for 5 years.   Has history of asthma and GERD  Takes Nexium and occasionally Tums for GERD, she does report fairly significant heart burn during the afternoon   She is on Airsupra for asthma.  Never smoker    Past Medical History:   Diagnosis Date    Allergic     Anxiety     Depression     Elevated cholesterol     Endometriosis     Family history of colonic polyps     Female infertility     GERD (gastroesophageal reflux disease)     Herpes     History of skin cancer     HPV (human papilloma virus) infection     (See  records) From  During 5 yr.  marriage.    Hypertension     IBS (irritable bowel syndrome)     Kidney stone     PONV (postoperative nausea and vomiting)     Sinusitis     Urinary tract infection     Varicella ??       Past Surgical History:   Procedure Laterality Date    APPENDECTOMY      BREAUX PROCEDURE  07/29/2015    BREAST BIOPSY  2001    x 2    CATARACT EXTRACTION  2020    COLONOSCOPY  05/14/2015    Normal; Repeat 5 years    COLONOSCOPY  08/17/2007    Normal; Repeat 7 years    COLONOSCOPY N/A 07/17/2020    The entire examined colon is normal on direct and retroflexion views; No specimens collected; Repeat 5 years    DIAGNOSTIC LAPAROSCOPY EXPLORATORY LAPAROTOMY      For endometriosis    ENDOSCOPY  07/29/2015    Medium-sized HH; Normal stomach; Normal examined duodenum; BRAVO pH capsule deployed; No specimens collected    ENDOSCOPY  05/14/2015    HH; Schatzki's ring-dilated to 20mm; LA grade B esophagitis    ENDOSCOPY N/A 12/07/2023    Medium-sized HH; There is no endoscopic evidence of Fonseca's esophagus; Normal stomach; Normal examined duodenum; No specimens collected    HYSTERECTOMY      TONSILLECTOMY      TOTAL ABDOMINAL HYSTERECTOMY WITH SALPINGO OOPHORECTOMY  1998    Dr. Aldana    WISDOM TOOTH EXTRACTION  [id number]       Family History: Her family history includes Breast cancer in her mother; Colon polyps in her brother and mother; Diabetes in her brother; Hypertension in her mother; Prostate cancer in her brother; Stroke in her father and paternal grandfather.     Social History: She  reports that she has never smoked. She has been exposed to tobacco smoke. She has never used smokeless tobacco. She reports current alcohol use. She reports that she does not use drugs.    Home Medications:  Albuterol-Budesonide, Azelastine HCl, Evolocumab, busPIRone, desvenlafaxine, esomeprazole, fluticasone, gabapentin, levocetirizine, promethazine-codeine, and vitamin D    Allergies:  She has No Known Allergies.       Vital Signs:   Temp:  [97.7 °F  (36.5 °C)] 97.7 °F (36.5 °C)  Heart Rate:  [87] 87  BP: (147)/(87) 147/87  ENT Physical Exam  Constitutional  Appearance: patient appears well-developed, well-nourished and well-groomed,  Communication/Voice: communication appropriate for developmental age; vocal quality normal;  Head and Face  Appearance: head appears normal, face appears normal and face appears atraumatic;  Palpation: facial palpation normal;  Salivary: glands normal;  Ear  Hearing: intact;  Auricles: bilateral auricles normal;  External Mastoids: bilateral external mastoids normal;  Ear Canals: bilateral ear canals normal;  Tympanic Membranes: bilateral tympanic membranes normal;  Nose  External Nose: nares patent bilaterally; external nose normal;  Internal Nose: nasal mucosa normal; bilateral inferior turbinates normal;  Oral Cavity/Oropharynx  Lips: normal;  Teeth: normal;  Gums: gingiva normal;  Tongue: normal;  Oral mucosa: normal;  Hard palate: normal;  Soft palate: normal;  Tonsils: normal;  Respiratory  Inspection: breathing unlabored; normal breathing rate;  Respiratory comments: Dry, hacking cough noted  Cardiovascular  Inspection: extremities are warm and well perfused;  Neurovestibular  Mental Status: alert and oriented;       Flexible laryngoscopy    Date/Time: 8/15/2024 11:53 AM    Performed by: Kevan Egan APRN  Authorized by: Kevan Egan APRN    Consent:     Consent obtained:  Verbal    Consent given by:  Patient    Risks discussed:  Bleeding, infection, nerve damage and pain    Alternatives discussed:  No treatment, delayed treatment, observation and alternative treatment  Universal protocol:     Procedure explained and questions answered to patient or proxy's satisfaction: yes      Immediately prior to procedure, a time out was called: yes      Patient identity confirmed:  Verbally with patient  Procedure details:     Indications: hoarseness, dysphagia, or aspiration      Medication:  Afrin    Instrument: flexible  fiberoptic laryngoscope      Scope location: left nare    Oropharynx/ Supraglottis:     Posterior pharyngeal wall: inflamed      Vallecula: inflammation      Base of tongue: lingual tonsillar hypertrophy      Epiglottis: inflammation       comment:  Stiff, flat  Larynx/ Hypopharynx:     Arytenoids: inflammation and interarytenoid edema      Hypopharynx: inflammation      Pyriform sinus: inflammation and pooling of secretions (mild)      False vocal cords: inflammation      True vocal cords: Lee Ann's edema      True vocal cords: no reduced mobility, no lesion, no vocal cord atrophy and no nodules    Post-procedure details:     Patient tolerance of procedure:  Tolerated well     Result Review       RESULTS REVIEW    I have reviewed the patients old records in the chart.   The following results/records were reviewed:     Office Visit with Darling Miles APRN (07/31/2024)     IgE + Allergens (22) (05/14/2024 10:18)     Alpha - 1 - Antitrypsin (05/14/2024 00:00)     CBC & Differential (05/14/2024 10:18)          Assessment & Plan  Chronic cough    Hoarseness of voice    Environmental allergies    Mild intermittent asthma without complication             Orders Placed This Encounter   Procedures    Allergens (21) Pollens    Allergens (22) Foods    Allergens (19)    $ Laryngoscopy           Medical and surgical options were discussed including observation, continued medical management, medication modification, and surgical management. Risks, benefits and alternatives were discussed and questions were answered. After considering the options, the patient decided to proceed with medication modification.  Continue current management.  Increase water/ non caffeinated drink intake to at least 6-8 glasses a day. Decrease caffeine intake. Plain Mucinex over the counter as needed to thin out secretions.  Sleep with the head of bed on an incline. No large meals 1-2 hrs prior to sleep. Avoid fatty meals and caffine or alcohol  prior to sleep.   Take 3-4 tums (Calcium Carbonate) right before going to bed to neutralize stomach acid.  Take proton pump inhibitor (Omeprazole, Prilosec, Prevacid, Protonix etc) 30-60 minutes prior o the last meal of the day.  Strict reflux precaution  Diet and exercises  Increase water intake  Decrease caffeine and stimulants, increase water intake  Decrease chocolate and dairy  Switch Nexium dose to dinner time  Can try Pepcid once daily if desired  Antacid use    Continue flonase and astelin daily  Allergy testing, will start with blood work may consider prick testing pending these results    Call with questions or concerns    Return in about 8 weeks (around 10/10/2024) for Recheck, flex scope.        Electronically signed by RUDY Almanzar 08/15/24 11:57 AM CDT.

## 2024-08-21 LAB
BAKER'S YEAST IGE QN: <0.1 KU/L
BARLEY IGE QN: <0.1 KU/L
CASEIN IGE QN: <0.1 KU/L
CHEESE MOLD IGE QN: <0.1 KU/L
CLAM IGE QN: <0.1 KU/L
COCOA IGE QN: <0.1 KU/L
CODFISH IGE QN: <0.1 KU/L
CONV CLASS DESCRIPTION: NORMAL
CORN IGE QN: <0.1 KU/L
COW MILK IGE QN: <0.1 KU/L
CRAB IGE QN: <0.1 KU/L
EGG WHITE IGE QN: <0.1 KU/L
GLUTEN IGE QN: <0.1 KU/L
OAT IGE QN: <0.1 KU/L
PEA IGE QN: <0.1 KU/L
PEANUT IGE QN: <0.1 KU/L
PECAN/HICK NUT IGE QN: <0.1 KU/L
RYE IGE QN: <0.1 KU/L
SESAME SEED IGE QN: <0.1 KU/L
SHRIMP IGE QN: <0.1 KU/L
SOYBEAN IGE QN: <0.1 KU/L
WALNUT IGE QN: <0.1 KU/L
WHEAT IGE QN: <0.1 KU/L

## 2024-08-22 LAB
AMER BEECH IGE QN: <0.1 KU/L
BERMUDA GRASS IGE QN: <0.1 KU/L
BOXELDER IGE QN: <0.1 KU/L
CALIF WALNUT POLN IGE QN: <0.1 KU/L
CMN PIGWEED IGE QN: <0.1 KU/L
COCKLEBUR IGE QN: <0.1 KU/L
COMMON RAGWEED IGE QN: <0.1 KU/L
CONV CLASS DESCRIPTION: NORMAL
COTTONWOOD IGE QN: <0.1 KU/L
ENGL PLANTAIN IGE QN: <0.1 KU/L
GIANT RAGWEED IGE QN: <0.1 KU/L
GOOSEFOOT IGE QN: <0.1 KU/L
JOHNSON GRASS IGE QN: <0.1 KU/L
KENT BLUE GRASS IGE QN: <0.1 KU/L
MUGWORT IGE QN: <0.1 KU/L
PECAN/HICK TREE IGE QN: <0.1 KU/L
SHEEP SORREL IGE QN: <0.1 KU/L
SILVER BIRCH IGE QN: <0.1 KU/L
WEST RAGWEED IGE QN: <0.1 KU/L
WHITE ASH IGE QN: <0.1 KU/L
WHITE ELM IGE QN: <0.1 KU/L
WHITE OAK IGE QN: <0.1 KU/L

## 2024-08-23 ENCOUNTER — TELEPHONE (OUTPATIENT)
Dept: OTOLARYNGOLOGY | Facility: CLINIC | Age: 69
End: 2024-08-23
Payer: COMMERCIAL

## 2024-08-23 LAB
A ALTERNATA IGE QN: <0.1 KU/L
A FUMIGATUS IGE QN: <0.1 KU/L
C ALBICANS IGE QN: <0.1 KU/L
C HERBARUM IGE QN: <0.1 KU/L
CAT DANDER IGE QN: <0.1 KU/L
CHICKEN FEATHER IGE QN: <0.1 KU/L
CONV CLASS DESCRIPTION: NORMAL
D FARINAE IGE QN: <0.1 KU/L
D PTERONYSS IGE QN: <0.1 KU/L
DOG DANDER IGE QN: <0.1 KU/L
DUCK FEATHER IGE QN: <0.1 KU/L
E PURPURASCENS IGE QN: <0.1 KU/L
F MONILIFORME IGE QN: <0.1 KU/L
GOOSE FEATHER IGE QN: <0.1 KU/L
HORSE EPITH IGE QN: <0.1 KU/L
M RACEMOSUS IGE QN: <0.1 KU/L
P BETAE IGE QN: <0.1 KU/L
P NOTATUM IGE QN: <0.1 KU/L
R NIGRICANS IGE QN: <0.1 KU/L
T RUBRUM IGE QN: <0.1 KU/L

## 2024-08-23 NOTE — TELEPHONE ENCOUNTER
Spoke with patient about results and advised to continue treatment and will recheck at follow up appointment. Patient VU

## 2024-08-23 NOTE — TELEPHONE ENCOUNTER
----- Message from Kevan Egan sent at 8/23/2024  8:45 AM CDT -----  All allergy panels negative. Advise to continue current treatment with nasal sprays and strict reflux precautions. Follow up for recheck as scheduled. Call with any new or worsening symptoms.

## 2024-10-14 ENCOUNTER — OFFICE VISIT (OUTPATIENT)
Dept: OTOLARYNGOLOGY | Facility: CLINIC | Age: 69
End: 2024-10-14
Payer: COMMERCIAL

## 2024-10-14 VITALS
TEMPERATURE: 98 F | WEIGHT: 137 LBS | HEART RATE: 90 BPM | DIASTOLIC BLOOD PRESSURE: 54 MMHG | SYSTOLIC BLOOD PRESSURE: 133 MMHG | HEIGHT: 64 IN | BODY MASS INDEX: 23.39 KG/M2

## 2024-10-14 DIAGNOSIS — R49.0 HOARSENESS OF VOICE: ICD-10-CM

## 2024-10-14 DIAGNOSIS — R05.3 CHRONIC COUGH: Primary | ICD-10-CM

## 2024-10-14 DIAGNOSIS — J45.20 MILD INTERMITTENT ASTHMA WITHOUT COMPLICATION: ICD-10-CM

## 2024-10-14 NOTE — PATIENT INSTRUCTIONS
"THE PROBLEM OF ACID REFLUX    In BOTH children and adults, irritating acids may leak from the stomach and come up into the esophagus and throat. This can occur at any time, but occurs more commonly when lying down. When the acid touches the lining of the esophagus, there is irritation and muscle spasm, which can be felt up into the throat. Usually this is felt as \"heartburn\". When scarring of the esophagus occurs, the esophagus becomes less sensitive and the symptoms may only be in the throat.     Some of the symptoms that can occur with acid reflux into the throat include coughing, soreness, burning, hoarseness, throat clearing, excessive mucous, bad taste in the mouth, bad breath, a sensation of a lump in the throat, wheezing, post-nasal drip, choking spells, bleeding and nausea. In a small percentage of people, more serious problems may result, such as pneumonia, ulcers in the larynx (voice box), reduced mobility of the vocal cords and a pouch in the upper esophagus (diverticulum). In children, the symptoms may be different and include persistent vomiting, bleeding from the esophagus, respiratory problems, pneumonia, asthma, choking spells, swallowing problems and anemia. In some cases, unexplained fussiness and crying is due to reflux.     The following instructions are designed to help neutralize the stomach acid, reduce the production of the acid, promote emptying of the acid from the stomach into the intestines and prevent acid from coming up into the esophagus. You should adopt enough of these changes to alleviate your symptoms. If carrying out these suggestions produces no change, it is imperative that you return so that we can discuss further the problem. More testing may be required, as might medication. It is important to realize that the esophagus takes time to heal, so you should not expect results immediately.    Diet  Most often, the throat symptoms above are due to dietary abuses. Certain foods are " known to cause irritation, because they are acids themselves or cause excess production of stomach acid. These should be avoided, if possible. The following is a partial list of foods recognized as troublesome: coffee (even decaffeinated), tea chocolate, cola beverages, citrus beverages (such as 7-Up), caffeine containing beverages, alcohol, citrus fruits and juices, highly spiced foods, fatty foods, candies, nuts, mints, milk and milk products. Some of the worst offenders for promoting stomach acid are milk and beer.     Hard candies, gum, breath fresheners, throat lozenges, cough drops, mouthwashes, gargles, may actually irritate the throat directly (many cough drops and lozenges contain irritants such as oil of eucalyptus and menthol), and can also stimulate acid production directly.     Large amounts of liquid in the diet tend to promote reflux, because liquids tend to come back up more easily than solids.     Meals should be bland and consist of real food, trying to avoid recreational food. Multiple small meals, rather than one or two large meals helps prevent reflux by not stretching the stomach and increasing the time needed for digestion, as well increasing the amount of acid needed to digest the meal. If you are overweight, losing weight is tremendously helpful.     YOU SHOULD NOT EAT FOR AT LEAST TWO HOURS BEFORE RETIRING AND YOU SHOULD REMAIN SITTING OR STANDING FOR AT LEAST 45 MINUTES AFTER EACH MEAL. This promotes passage of food from the stomach into the intestine.    Posture  Body weight is a significant factor in promoting reflux. Losing weight is beneficial. Pregnancy will markedly increase the symptoms of heartburn and throat pain. You should avoid clothing that fits tightly across the mid-section, as this promotes squeezing of the abdominal contents upward. It is helpful to practice abdominal or diaphragmatic breathing, using the stomach to push out each breath rather than chest expansion. Avoid  slumping while sitting, and avoid stooping or straining.     For many, the reflux occurs at night while sleeping. This is the time acid production is the greatest and you are lying down, a position that promotes reflux, thus setting up the irritation that occurs the next morning. One of the most important things you can do is to elevate the head of the bed, in an effort to use gravity to keep the acid in the stomach. This is accomplished by placed blocks or bricks beneath the legs at the head of the bed, raising the head 6-10 inches. Do not make the head so high that you slide down. Pillows are not effective because they cause the body to curl, increasing abdominal pressure and it is difficult to remain upright on them. Additionally, pillows promote sleeping on the back, but it is far more desirable to sleep on the right side or on the stomach, as this allows gas to escape, while preventing the escape of acid material. Children and infants, who are refluxing, should sleep on their stomachs.  Exercise  Regular exercise is extremely beneficial in promoting good digestion and regularity. The abdominal muscles are toned, which aids in controlling acid problems. However, it is recommended that you not participate in vigorous activity immediately after eating. This causes pressure on an already full stomach, forcing acid up into the esophagus. Regular exercise is encouraged, prior to meals, or two hours after meals.  Antacids  Antacids should be used regularly, as they neutralize excess acid. Gelusil II, Mylanta II, Tums and Rolaids are popular brands. Gaviscon is not an antacid, but floats on top of the stomach acid, preventing esophageal irritation. Care should be used in administering large doses of antacids, especially in children. Many antacid preparations contain magnesium, which promotes frequent bowel movements, while antacids that contain aluminum can be constipating. Tums have a high calcium content that can be  used to some advantage in older women with reflux.     Antacid tablets are convenient, but the liquid form tends to work much more quickly. Also remember to check with your physician about interference with other medications. Antacids can slow the absorption of digitalis, Indocin, tetracyclines, fluorides and isoniazid from the intestines. Many medications require the presence of stomach acid to be absorbed.     Initially, antacids should be used 30 minutes after each meal, 2 hours after each meal and at bedtime. This maximizes the neutralization of the stomach acid. Antacids can be used more frequently if symptoms persist, but such extensive use needs to be reviewed with your physician.  Prescription Medications  If the above recommendations are not effectively resolving the symptoms, medications may be prescribed. These are usually in the form of acid production blockers, such as Tagamet, Zantac, Pepcid, or Axid or acid pump inhibitors like Prevacid, Nexium, Protonix or Prilosec. These drugs help stop acid production in the stomach. Medications such as Reglan can be used to promote stomach emptying.  Medications That Promote Reflux  Certain medications can promote acid reflux; either by relaxing the sphincter muscle that helps keeps stomach acid down, or increasing the acid production. These include progesterone (Provera, Ortho Novum, Ovral, other birth control pills), theophylline (Kevyn-Dur, etc.), anticholinergic (Donnatal, Scopolamine, Probanthine, Bentil, others), beta blockers (Inderal, Tenormin and Corgard), alpha blockers (Dibenzyline), dopamine, calcium channel blockers (Procardia, Cardizem, Isotin, Calan), aspirin, non-steroidal anti-inflammatory drugs (Motrin, Advil, Nuprin, Nalfon, Rufen, Indocin, Feldene, Clinoril and Tolectin). Vitamin C is an acid and can promote reflux. This is only a partial list and before stopping any prescription medication, you should check with your physician.    Goal  The  goal is to reduce the symptoms with the least number of lifestyle changes. A combination of the above suggestions is frequently prescribed to return you to normal as quickly as possible. However, this problem did not develop overnight and will not disappear rapidly. Therefore, developing a regimen will aid in controlling symptoms and keep you symptom free for a long period of time. Other alternatives exist, should these suggestions fail, and can be discussed with your physician.     To Summarize:  Watch your diet, avoid foods that cause acid reflux  Lose weight  Avoid tight fitting clothes  Adjust your posture, raise the head of the bed  Exercise regularly  Use antacids  Use prescription medication as directed  Avoid medications that promote reflux  Avoid behavior and eating habits that promote reflux of stomach acid     You are welcome to do a Google search on the topic of reflux and reflux diets. The internet has many useful resources.     Please remember, your success in controlling this condition depends on many factors including diet, exercise, medications and follow up. All are important and must be utilized to achieve success.

## 2024-10-14 NOTE — PROGRESS NOTES
"    RUDY Almanzar  [facility]  [address], [address]  [facility] [address]  Fax [phone]  Phone [phone]      Visit Type: FOLLOW UP   Chief Complaint   Patient presents with    Recheck cough and hoarseness     Cough has improved but is back to clearing her throat. Hoarseness has improved           HPI  Jenni Ahn is a 69 y.o. female who presents for Follow up, recheck cough and hoarseness. Has been present for years but describes as variable in intensity.  States has been notably worse for the last 8 to 9 months.  Associated symptoms include hoarseness.  She also admits chronic history of reflux. Has frequent episodes of heart burn and \"refluxing into throat\".     Since last visit she reports she has had improvement in symptoms in general. Cough has gotten much better, still has some episodic cough but in general has improved. Hoarseness has resolved for the most part, still has some issues with singing but denies actual hoarseness. Denies dysphagia. She does admit some globus sensation. She is using Nexium BID, uses antacids occasionally now but feels reflux is improving. She has not changed her diet or exercise really.  She still has some episodes of heart burn and reflux into throat but again much less often. Denies new complaints or concerns.   She states she is not using airsupra recently.  She has not been using nasal sprays regularly    Past Medical History:   Diagnosis Date    Allergic     Anxiety     Depression     Elevated cholesterol     Endometriosis     Family history of colonic polyps     Female infertility     GERD (gastroesophageal reflux disease)     Herpes     History of skin cancer     HPV (human papilloma virus) infection     (See  records) From  During 5 yr. marriage.    Hypertension     IBS (irritable bowel syndrome)     Kidney stone     PONV (postoperative nausea and vomiting)     " Sinusitis     Urinary tract infection     Varicella ??       Past Surgical History:   Procedure Laterality Date    APPENDECTOMY      BREAUX PROCEDURE  07/29/2015    BREAST BIOPSY  2001    x 2    CATARACT EXTRACTION  2020    COLONOSCOPY  05/14/2015    Normal; Repeat 5 years    COLONOSCOPY  08/17/2007    Normal; Repeat 7 years    COLONOSCOPY N/A 07/17/2020    The entire examined colon is normal on direct and retroflexion views; No specimens collected; Repeat 5 years    DIAGNOSTIC LAPAROSCOPY EXPLORATORY LAPAROTOMY      For endometriosis    ENDOSCOPY  07/29/2015    Medium-sized HH; Normal stomach; Normal examined duodenum; BRAVO pH capsule deployed; No specimens collected    ENDOSCOPY  05/14/2015    HH; Schatzki's ring-dilated to 20mm; LA grade B esophagitis    ENDOSCOPY N/A 12/07/2023    Medium-sized HH; There is no endoscopic evidence of Fonseca's esophagus; Normal stomach; Normal examined duodenum; No specimens collected    HYSTERECTOMY      TONSILLECTOMY      TOTAL ABDOMINAL HYSTERECTOMY WITH SALPINGO OOPHORECTOMY  1998    Dr. Jovan PORTILLO TOOTH EXTRACTION  [id number]       Family History: Her family history includes Breast cancer in her mother; Colon polyps in her brother and mother; Diabetes in her brother; Hypertension in her mother; Prostate cancer in her brother; Stroke in her father and paternal grandfather.     Social History: She  reports that she has never smoked. She has been exposed to tobacco smoke. She has never used smokeless tobacco. She reports current alcohol use. She reports that she does not use drugs.    Home Medications:  Albuterol-Budesonide, Azelastine HCl, Evolocumab, busPIRone, desvenlafaxine, esomeprazole, fluticasone, gabapentin, promethazine-codeine, and vitamin D    Allergies:  She has No Known Allergies.       Vital Signs:   Temp:  [98 °F (36.7 °C)] 98 °F (36.7 °C)  Heart Rate:  [90] 90  BP: (133)/(54) 133/54  ENT Physical Exam  Constitutional  Appearance: patient appears  well-developed, well-nourished and well-groomed,  Communication/Voice: communication appropriate for developmental age; vocal quality normal;  Head and Face  Appearance: head appears normal, face appears normal and face appears atraumatic;  Palpation: facial palpation normal;  Salivary: glands normal;  Ear  Hearing: intact;  Auricles: bilateral auricles normal;  External Mastoids: bilateral external mastoids normal;  Ear Canals: bilateral ear canals normal;  Tympanic Membranes: bilateral tympanic membranes normal;  Nose  External Nose: nares patent bilaterally; external nose normal;  Internal Nose: nasal mucosa normal; bilateral inferior turbinates normal;  Oral Cavity/Oropharynx  Lips: normal;  Teeth: normal;  Gums: gingiva normal;  Tongue: normal;  Oral mucosa: normal;  Hard palate: normal;  Soft palate: normal;  Tonsils: normal;  Respiratory  Inspection: breathing unlabored; normal breathing rate;  Cardiovascular  Inspection: extremities are warm and well perfused;  Neurovestibular  Mental Status: alert and oriented;       Flexible laryngoscopy    Date/Time: 10/14/2024 10:06 AM    Performed by: Kevan Egan APRN  Authorized by: Kevan gEan APRN    Consent:     Consent obtained:  Verbal    Consent given by:  Patient    Risks discussed:  Infection, nerve damage, bleeding and pain    Alternatives discussed:  No treatment, alternative treatment, observation and delayed treatment  Universal protocol:     Procedure explained and questions answered to patient or proxy's satisfaction: yes      Immediately prior to procedure, a time out was called: yes      Patient identity confirmed:  Verbally with patient  Anesthesia (see MAR for exact dosages):     Anesthesia method:  Topical application    Topical anesthetic:  Tetracaine  Procedure details:     Indications: evaluation of larynx and hoarseness, dysphagia, or aspiration      Medication:  Afrin    Instrument: flexible fiberoptic laryngoscope      Scope location:  left nare    Oropharynx/ Supraglottis:     Posterior pharyngeal wall: inflamed      Posterior pharyngeal wall comment:  Mild    Vallecula: no inflammation      Base of tongue: lingual tonsillar hypertrophy      Epiglottis: normal    Larynx/ Hypopharynx:     Arytenoids: interarytenoid edema      Hypopharynx: inflammation (mild)      Hypopharynx: no asymmetry      Pyriform sinus: no pooling of secretions, no left pyriform mass and no no rifht pyriform mass      False vocal cords: inflammation (mild)      False vocal cords: no lesion      True vocal cords: Lee Ann's edema (mild thickening)      True vocal cords: no reduced mobility, no vocal cord atrophy and no nodules    Post-procedure details:     Patient tolerance of procedure:  Tolerated well         Result Review       RESULTS REVIEW    I have reviewed the patients old records in the chart.      Allergens (21) Pollens (08/15/2024 12:30)     Allergens (22) Foods (08/15/2024 12:30)     Allergens (19) (08/15/2024 12:30)          Assessment & Plan  Chronic cough    Hoarseness of voice    Mild intermittent asthma without complication             Orders Placed This Encounter   Procedures    $ Laryngoscopy           Patient appears to be improving since starting more reflux precautions and increasing nexium to BID. She believes she is taking 20 mg BID and using antacids as needed. I do suspect reflux is primary cause of her cough and overall symptoms. I discussed swallow study or ct scan but at this time she declines, she is agreeable to follow up with GI for their input on further reflux precautions. She already sees Dr. Soliz [facility], she will call for an appointment with them. I will plan to continue basic treatment plan for now and will recheck with her again in about 3 months. She will call with any new or worsening symptoms.     Medical and surgical options were discussed including observation, continued medical management, medication modification, and surgical  management. Risks, benefits and alternatives were discussed and questions were answered. After considering the options, the patient decided to proceed with continued medical management.    Continue Nexium  Antacid use  Increase water intake  Work on diet and exercise  Consider restarting astelin, she does not wish to use flonase, states steroids makes her lose her hair      Call with questions or concerns    Return in about 3 months (around 1/14/2025) for Recheck cough and hoarseness.        Electronically signed by RUDY Almanzar 10/14/24 10:06 AM CDT.

## 2024-11-13 ENCOUNTER — TRANSCRIBE ORDERS (OUTPATIENT)
Dept: ADMINISTRATIVE | Facility: HOSPITAL | Age: 69
End: 2024-11-13
Payer: COMMERCIAL

## 2024-11-13 DIAGNOSIS — Z12.31 SCREENING MAMMOGRAM FOR BREAST CANCER: Primary | ICD-10-CM

## 2024-11-15 ENCOUNTER — HOSPITAL ENCOUNTER (OUTPATIENT)
Dept: CT IMAGING | Facility: HOSPITAL | Age: 69
Discharge: HOME | End: 2024-11-15
Admitting: NURSE PRACTITIONER
Payer: COMMERCIAL

## 2024-11-15 DIAGNOSIS — R91.8 MULTIPLE LUNG NODULES: ICD-10-CM

## 2024-11-15 PROCEDURE — 71250 CT THORAX DX C-: CPT

## 2024-11-15 NOTE — PROGRESS NOTES
RUDY Pinzon  [facility]   Pulmonary and Critical Care  [address] [place] [address]  Phone: [phone]  Fax: [phone]           Chief Complaint  Asthma    Subjective        Jenni Ary Ahn presents to [facility]     History of Present Illness  Ms. Ahn is a 69-year-old female patient with asthma, allergies, esophageal dysmotility, anxiety, depression, vitamin D deficiency, GERD, history of skin cancer, hypertension, sinusitis. Family history of asthma.     Overall she had been improving with her cough after seeing ENT and being put on Flonase, Astelin and Nexium.  A few weeks ago however she experienced sinus drainage and pressure with postnasal drip.  She did not feel like she had a full sinus infection though.  She noted she had not been using the Flonase and Astelin consistently.  She had prior hair loss with oral steroids and was concerned the Flonase may be contributing as well.  She continues to feel like her cough and mucus are in her throat and not her chest.  She was given Airsupra back in July and has not needed it since she was first provided the medication.  Her allergy lab work to ENT was negative.  She has some concerns about the validity of blood allergy testing versus skin prick testing.  She is unable to raise the head of her bed.  She does try to prop herself up.  She does admit that she has not followed a GERD diet or avoided eating or drinking at night.  She states due to her significant fatigue when she gets home from work she often will go sit up/lay down in her bed and often has meals brought to her at the bedside.    She has fibromyalgia and experiences pain in her legs and arms when she lies down. She is on gabapentin.    Absolute eosinophils in May were 400.  CT of the chest showed multiple 5 mm or less nodules stable over 6 months.  Small to moderate hiatal hernia and scarring at the  "apices.        Objective   Vital Signs:   /84   Pulse 108   Ht 162.6 cm (64.02\")   Wt 62.5 kg (137 lb 12.8 oz)   SpO2 99% Comment: RA  BMI 23.64 kg/m²     Physical Exam  Vitals reviewed.   Constitutional:       Appearance: Normal appearance.   Cardiovascular:      Rate and Rhythm: Normal rate and regular rhythm.   Pulmonary:      Effort: Pulmonary effort is normal.      Breath sounds: Normal breath sounds.   Neurological:      General: No focal deficit present.      Mental Status: She is alert and oriented to person, place, and time.   Psychiatric:         Mood and Affect: Mood normal.         Behavior: Behavior normal.          Result Review :  The following data was reviewed by: RUDY Pinzon on 11/18/2024:    Data reviewed : Radiologic studies CT of the chest    My interpretation of imaging: Multiple 5 mm or less nodule stable in 6 months.  Small to moderate hiatal hernia, scarring at the apices  My interpretation of labs: Normal allergy testing performed by ENT in August  CT Chest Without Contrast Diagnostic (11/15/2024 09:04)   Allergens (19) (08/15/2024 12:30)   Allergens (22) Foods (08/15/2024 12:30)   Allergens (21) Pollens (08/15/2024 12:30)   My interpretation of the PFT : No new    Results for orders placed during the hospital encounter of 07/31/24    Complete PFT - Pre & Post Bronchodilator    Narrative  [facility] - Pulmonary Function Test    [address] [place] [address]  [id number]  [phone]    Patient : Jenni Ahn  MRN : [id number]  CSN : [id number]  Pulmonologist : Juancarlos Hansen MD  Date : 7/31/2024    ______________________________________________________________________    Interpretation :  1.  Spirometry reveals a mild decrease in peak expiratory flow, and otherwise is within normal limits.  2.  There is improvement in peak expiratory flow postbronchodilator.  Postbronchodilator spirometry is within normal limits.  3.  Lung volumes reveal an elevated " expiratory reserve volume and otherwise are within normal limits.  4.  There is a mild diffusion impairment which when corrected for alveolar volume is a low normal diffusion capacity.  5.  There is some flattening of the inspiratory limb of the flow-volume loop which could be seen with a variable extrathoracic upper airway obstruction, clinical correlation is advised.      Juancarlos Hansen MD          Assessment and Plan   Diagnoses and all orders for this visit:    1. Mild intermittent asthma without complication (Primary)  Assessment & Plan:  Continue as needed Airsupra.            2. Multiple lung nodules  Assessment & Plan:  Reviewed CT results with patient in the office today.  Will plan repeat CT in 1 year.    Orders:  -     CT Chest Without Contrast Diagnostic; Future    3. Chronic cough  Assessment & Plan:  Her cough did improve with the adjustment of her acid reflux medications as well as nasal sprays.  She was advised to resume the nasal sprays and continue to follow her acid reflux guidelines as outlined by ENT.  This is to include avoiding chocolate, milk, caffeine and increase water intake.  Avoid eating or drinking prior to bedtime.  Given that her cough did improve with the adjustment of her GERD routine would feel this cough is more likely related to the to acid reflux then to her asthma.      4. Elevated diaphragm  Comments:  Right    5. Hiatal hernia  Comments:  She will discuss this with GI.           Alpha 1: Normal, MM  LDCT: never smoker   Smoking Cessation: never smoker   Vaccinations: Flu: completed PNA: completed         Follow Up   Return in about 1 year (around 11/18/2025) for CT.  Patient was given instructions and counseling regarding her condition or for health maintenance advice. Please see specific information pulled into the AVS if appropriate.     Darling Miles, RUDY  11/18/2024  12:38 CST    Please note that portions of this note were completed with a voice recognition  program.    Patient or patient representative verbalized consent for the use of Ambient Listening during the visit with  RUDY Pinzon for chart documentation. 11/18/2024  12:38 CST

## 2024-11-18 ENCOUNTER — OFFICE VISIT (OUTPATIENT)
Dept: PULMONOLOGY | Facility: CLINIC | Age: 69
End: 2024-11-18
Payer: COMMERCIAL

## 2024-11-18 VITALS
OXYGEN SATURATION: 99 % | HEART RATE: 108 BPM | WEIGHT: 137.8 LBS | HEIGHT: 64 IN | DIASTOLIC BLOOD PRESSURE: 84 MMHG | BODY MASS INDEX: 23.52 KG/M2 | SYSTOLIC BLOOD PRESSURE: 142 MMHG

## 2024-11-18 DIAGNOSIS — R91.8 MULTIPLE LUNG NODULES: ICD-10-CM

## 2024-11-18 DIAGNOSIS — R05.3 CHRONIC COUGH: ICD-10-CM

## 2024-11-18 DIAGNOSIS — K44.9 HIATAL HERNIA: ICD-10-CM

## 2024-11-18 DIAGNOSIS — J98.6 ELEVATED DIAPHRAGM: ICD-10-CM

## 2024-11-18 DIAGNOSIS — J45.20 MILD INTERMITTENT ASTHMA WITHOUT COMPLICATION: Primary | Chronic | ICD-10-CM

## 2024-11-18 PROCEDURE — 99214 OFFICE O/P EST MOD 30 MIN: CPT | Performed by: NURSE PRACTITIONER

## 2024-11-18 RX ORDER — PENICILLIN V POTASSIUM 500 MG/1
1 TABLET, FILM COATED ORAL EVERY 6 HOURS
COMMUNITY
Start: 2024-10-17 | End: 2024-11-18

## 2024-11-18 RX ORDER — CLINDAMYCIN HYDROCHLORIDE 300 MG/1
1 CAPSULE ORAL 3 TIMES DAILY
COMMUNITY
Start: 2024-10-24 | End: 2024-11-18

## 2024-11-18 NOTE — ASSESSMENT & PLAN NOTE
Her cough did improve with the adjustment of her acid reflux medications as well as nasal sprays.  She was advised to resume the nasal sprays and continue to follow her acid reflux guidelines as outlined by ENT.  This is to include avoiding chocolate, milk, caffeine and increase water intake.  Avoid eating or drinking prior to bedtime.  Given that her cough did improve with the adjustment of her GERD routine would feel this cough is more likely related to the to acid reflux then to her asthma.

## 2024-12-01 LAB
NCCN CRITERIA FLAG: NORMAL
TYRER CUZICK SCORE: 8.9

## 2024-12-04 ENCOUNTER — HOSPITAL ENCOUNTER (OUTPATIENT)
Dept: MAMMOGRAPHY | Facility: HOSPITAL | Age: 69
Discharge: HOME | End: 2024-12-04
Admitting: INTERNAL MEDICINE
Payer: COMMERCIAL

## 2024-12-04 DIAGNOSIS — Z12.31 SCREENING MAMMOGRAM FOR BREAST CANCER: ICD-10-CM

## 2024-12-04 PROCEDURE — 77067 SCR MAMMO BI INCL CAD: CPT

## 2024-12-04 PROCEDURE — 77063 BREAST TOMOSYNTHESIS BI: CPT

## 2024-12-17 ENCOUNTER — OFFICE VISIT (OUTPATIENT)
Dept: OBSTETRICS AND GYNECOLOGY | Age: 69
End: 2024-12-17
Payer: COMMERCIAL

## 2024-12-17 VITALS
WEIGHT: 136 LBS | SYSTOLIC BLOOD PRESSURE: 104 MMHG | BODY MASS INDEX: 23.22 KG/M2 | DIASTOLIC BLOOD PRESSURE: 70 MMHG | HEIGHT: 64 IN

## 2024-12-17 DIAGNOSIS — N39.3 STRESS INCONTINENCE: ICD-10-CM

## 2024-12-17 DIAGNOSIS — Z87.412 HISTORY OF VULVAR DYSPLASIA: ICD-10-CM

## 2024-12-17 DIAGNOSIS — Z01.419 WOMEN'S ANNUAL ROUTINE GYNECOLOGICAL EXAMINATION: Primary | ICD-10-CM

## 2024-12-17 DIAGNOSIS — K59.04 CHRONIC IDIOPATHIC CONSTIPATION: ICD-10-CM

## 2024-12-17 NOTE — PROGRESS NOTES
Chief Complaint   Patient presents with    Gynecologic Exam     Patient is new to our office and here to re-establish care. Patient is due for annual well GYN Exam. Last well GYN exam 10/8/21. Hysterectomy with BSO prior hx of endometriosis 1998. Last mammo 12/4/24, BIRADS Cat 1 neg. DEXA 2023 at [facility], osteopenia. No longer sexually active. C/o GOLD, for years. Struggles with constipation. Patient denies current pelvic pain, abnormal vaginal discharge, and voices no other complaints.        History:  Jenni Ahn is a 69 y.o. female who presents today for evaluation of the above problems.     Jenni Ahn is a 69 y.o. female here today for annual GYN examination.   She is postmenopausal and s/p hysterectomy.   Her last Pap smear was 2017, and was normal.   Last Mammogram   Not currently sexually active.   Declines STD screening.   HRT- none  Her medical history is reviewed.   GOLD is worsening. Wearing pads daily.  Struggles with constipation after starting pristiq  Taking stool softener.           ROS:  Review of Systems   Constitutional: Negative.    HENT: Negative.     Eyes: Negative.    Respiratory: Negative.     Cardiovascular: Negative.    Gastrointestinal: Negative.  Positive for constipation.   Endocrine: Negative.    Genitourinary: Negative.  Positive for urinary incontinence.   Musculoskeletal: Negative.    Skin: Negative.    Neurological: Negative.    Psychiatric/Behavioral: Negative.         No Known Allergies  Past Medical History:   Diagnosis Date    Allergic     Anxiety     Depression     Elevated cholesterol     Endometriosis     Family history of colonic polyps     Female infertility     GERD (gastroesophageal reflux disease)     Herpes     History of skin cancer     HPV (human papilloma virus) infection     (See  records) From  During 5 yr. marriage.    Hypertension     IBS (irritable bowel syndrome)     Kidney stone     PONV (postoperative nausea and vomiting)     Sinusitis      Urinary tract infection     Varicella ??     Past Surgical History:   Procedure Laterality Date    APPENDECTOMY      BREAUX PROCEDURE  07/29/2015    BREAST BIOPSY Bilateral 2001    x 2    CATARACT EXTRACTION  2020    COLONOSCOPY  05/14/2015    Normal; Repeat 5 years    COLONOSCOPY  08/17/2007    Normal; Repeat 7 years    COLONOSCOPY N/A 07/17/2020    The entire examined colon is normal on direct and retroflexion views; No specimens collected; Repeat 5 years    DIAGNOSTIC LAPAROSCOPY EXPLORATORY LAPAROTOMY      For endometriosis    ENDOSCOPY  07/29/2015    Medium-sized HH; Normal stomach; Normal examined duodenum; BRAVO pH capsule deployed; No specimens collected    ENDOSCOPY  05/14/2015    HH; Schatzki's ring-dilated to 20mm; LA grade B esophagitis    ENDOSCOPY N/A 12/07/2023    Medium-sized HH; There is no endoscopic evidence of Fonseca's esophagus; Normal stomach; Normal examined duodenum; No specimens collected    HYSTERECTOMY      TONSILLECTOMY      TOTAL ABDOMINAL HYSTERECTOMY WITH SALPINGO OOPHORECTOMY  1998    Dr. Jovan PORTILLO TOOTH EXTRACTION  [id number]     Family History   Problem Relation Age of Onset    Stroke Father     Breast cancer Mother 60    Colon polyps Mother         Unknown age    Hypertension Mother     Prostate cancer Brother     Diabetes Brother     Colon polyps Brother         > 60 years of age    Stroke Paternal Grandfather         I think    Colon cancer Neg Hx     Esophageal cancer Neg Hx     Liver cancer Neg Hx     Liver disease Neg Hx     Rectal cancer Neg Hx     Stomach cancer Neg Hx     Ovarian cancer Neg Hx     Uterine cancer Neg Hx     Melanoma Neg Hx       reports that she has never smoked. She has been exposed to tobacco smoke. She has never used smokeless tobacco. She reports current alcohol use. She reports that she does not use drugs.      Current Outpatient Medications:     busPIRone (BUSPAR) 7.5 MG tablet, Take 1 tablet by mouth As Needed., Disp: , Rfl:     desvenlafaxine  "(PRISTIQ) 100 MG 24 hr tablet, Take 1 tablet by mouth Daily., Disp: , Rfl:     esomeprazole (nexIUM) 40 MG capsule, Take 1 capsule by mouth 2 (Two) Times a Day., Disp: , Rfl:     gabapentin (NEURONTIN) 300 MG capsule, Take 1 capsule by mouth 2 (Two) Times a Day., Disp: , Rfl:     Repatha solution prefilled syringe injection, Inject 1 mL under the skin into the appropriate area as directed Every 14 (Fourteen) Days., Disp: , Rfl:     vitamin D (ERGOCALCIFEROL) 1.25 MG (43908 UT) capsule capsule, Take 1 capsule by mouth 1 (One) Time Per Week., Disp: , Rfl:     Albuterol-Budesonide (Airsupra) 90-80 MCG/ACT aerosol, Inhale 2 puffs As Needed (shortness of breath). (Patient not taking: Reported on 12/17/2024), Disp: 10.7 g, Rfl: 1    Azelastine HCl 137 MCG/SPRAY solution, Administer 2 sprays into the nostril(s) as directed by provider As Needed. (Patient not taking: Reported on 12/17/2024), Disp: , Rfl:     fluticasone (FLONASE) 50 MCG/ACT nasal spray, Administer 1 spray into the nostril(s) as directed by provider As Needed. (Patient not taking: Reported on 12/17/2024), Disp: , Rfl:     promethazine-codeine (PHENERGAN with CODEINE) 6.25-10 MG/5ML solution, Every 4 (Four) Hours As Needed. (Patient not taking: Reported on 12/17/2024), Disp: , Rfl:     OBJECTIVE:  /70   Ht 162.6 cm (64\")   Wt 61.7 kg (136 lb)   LMP  (LMP Unknown)   BMI 23.34 kg/m²    Physical Exam  Exam conducted with a chaperone present.   Constitutional:       Appearance: She is well-developed.   HENT:      Head: Normocephalic and atraumatic.   Eyes:      General: Lids are normal.      Conjunctiva/sclera: Conjunctivae normal.      Pupils: Pupils are equal, round, and reactive to light.   Neck:      Thyroid: No thyromegaly.   Cardiovascular:      Rate and Rhythm: Normal rate and regular rhythm.      Heart sounds: Normal heart sounds.   Pulmonary:      Effort: Pulmonary effort is normal.      Breath sounds: Normal breath sounds.   Chest: "   Breasts:     Breasts are symmetrical.      Right: No inverted nipple, mass, nipple discharge, skin change or tenderness.      Left: No inverted nipple, mass, nipple discharge, skin change or tenderness.   Abdominal:      General: Bowel sounds are normal.      Palpations: Abdomen is soft.   Genitourinary:     Exam position: Supine.      Labia:         Right: No rash, tenderness, lesion or injury.         Left: No rash, tenderness, lesion or injury.       Vagina: No signs of injury and foreign body. No vaginal discharge, erythema, tenderness or bleeding.      Uterus: Absent.       Adnexa:         Right: No mass, tenderness or fullness.          Left: No mass, tenderness or fullness.        Rectum: Normal. No tenderness or external hemorrhoid.      Comments: Uterus and cervix are surgically absent  Musculoskeletal:         General: Normal range of motion.      Cervical back: Normal range of motion and neck supple.   Skin:     General: Skin is warm and dry.   Neurological:      Mental Status: She is alert and oriented to person, place, and time.         Assessment/Plan    Diagnoses and all orders for this visit:    1. Women's annual routine gynecological examination (Primary)    2. Stress incontinence  Comments:  discussed pelvic floor PT, poise impressa and pessary    3. History of vulvar dysplasia    4. Chronic idiopathic constipation    Preventative and Immunizations:      - Tetanus: Unknown or >10 years ago. Recommend to have at pharmacy or on injury.      - Influenza: recommended annually      - Pneumovax:once after age 65      - Prevnar: Once after age 65      - Zostavax: Once after age 60  Colon Cancer Screening: due 2025  Mammogram: managed by PCP  PAP: s/p hysterectomy   DEXA:  BMD testing (DXA) in all women 65 years of age and older and in postmenopausal women younger than 65 years of age with clinical risk factors for fracture   COVID vaccine information is available at vaccine.[place].gov   Additional  preventative recommendations in AVS.    She will schedule a mammogram.  She will followup in one year or sooner if needed.       An After Visit Summary was printed and given to the patient at discharge.  Return in about 1 year (around 12/17/2025) for Annual physical.          Mattie GRANT 12/17/2024   Electronically signed

## 2025-07-30 ENCOUNTER — OFFICE VISIT (OUTPATIENT)
Dept: GASTROENTEROLOGY | Facility: CLINIC | Age: 70
End: 2025-07-30
Payer: COMMERCIAL

## 2025-07-30 VITALS
SYSTOLIC BLOOD PRESSURE: 132 MMHG | TEMPERATURE: 97 F | WEIGHT: 137 LBS | HEART RATE: 74 BPM | OXYGEN SATURATION: 98 % | BODY MASS INDEX: 23.39 KG/M2 | DIASTOLIC BLOOD PRESSURE: 82 MMHG | HEIGHT: 64 IN

## 2025-07-30 DIAGNOSIS — K21.9 GASTROESOPHAGEAL REFLUX DISEASE WITHOUT ESOPHAGITIS: ICD-10-CM

## 2025-07-30 DIAGNOSIS — Z83.719 FAMILY HISTORY OF POLYPS IN THE COLON: Primary | ICD-10-CM

## 2025-07-30 DIAGNOSIS — K59.00 CONSTIPATION, UNSPECIFIED CONSTIPATION TYPE: ICD-10-CM

## 2025-07-30 RX ORDER — CALCIUM CARBONATE 500 MG/1
1 TABLET, CHEWABLE ORAL AS NEEDED
COMMUNITY

## 2025-07-30 NOTE — ASSESSMENT & PLAN NOTE
..Pt is instructed to avoid caffeine, chocolate, peppermint and nicotine.  They are to avoid eating within 2-3 hours prior to bedtime.  I have urged patient to eat smaller amounts of food and drink rather than overeating.  Going to give her a trial of a Voquezna to see if this is something that would control her acid reflux symptoms better than her over-the-counter esomeprazole.  She is to let me know if she likes this better and if so we will try to get her a co-pay card and she can decide if it is worth the money to pay for this out-of-pocket.

## 2025-07-30 NOTE — ASSESSMENT & PLAN NOTE
Continue daily stool softeners I have educated her on the importance of drinking water as she is getting none of that on a daily basis.  I have also encouraged her to begin prunes daily because she states this is something she can tolerate.

## 2025-07-30 NOTE — PROGRESS NOTES
Primary Physician: Sebastián Batista DO    Chief Complaint   Patient presents with    Colon Cancer Screening     Pt presents today for colon recall-last colon was 7/17/2020; Family history of colon polyps; Pt does have issues with chronic constipation    Heartburn     Pt does c/o increased issues with reflux-states a few months ago she had to increase her Nexium to BID and takes Tums prn-still has reflux despite BID Nexium; Pt's last endo was 12/7/2023       Subjective     Jenni Ahn is a 69 y.o. female.    HPI  Family history of colon polyps  Mother and brother both had colon polyps.    7/17/2020 colonoscopy was normal.  5-year recall given.    Patient denies any changes of her bowel habits.  No diarrhea, constipation, abdominal pain, or rectal bleeding to report.  She does have chronic constipation and could not tolerate Miralax or Linzess.  She is currently taking 4 stool softeners daily  Admits to not drinking water on the daily.  She does have daily BM's at this point. NO blood in stool.       GERD  Patient with a history of acid reflux disease.  She is maintained on Nexium 20 mg twice daily.    12/7/2023 endoscopy showing a medium size hiatal hernia but no evidence of Fonseca's esophagus seen.  Stomach and esophagus were within normal limits.  July 2000 1548-hour Bravo study did show that her Nexium was effective at that time.    Taking Nexium 20mg BID in addition to daily TUMS.  She has cut down on caffeine.  3/18/2025 GFR 71.7/Creatinine 0.88  I have asked her to follow up with her PCP and find out when her next bone density study.  Pt still having acid reflux symptoms. Not terrible but definitely trying to monitor her diet to control her symptoms.    Past Medical History:   Diagnosis Date    Allergic     Anxiety     Depression     Elevated cholesterol     Endometriosis     Family history of colonic polyps     Female infertility     GERD (gastroesophageal reflux disease)     Herpes     History of skin  cancer     HPV (human papilloma virus) infection     (See  records) From  During 5 yr. marriage.    Hypertension     IBS (irritable bowel syndrome)     Kidney stone     PONV (postoperative nausea and vomiting)     Sinusitis     Urinary tract infection     Varicella ??       Past Surgical History:   Procedure Laterality Date    APPENDECTOMY      BREAUX PROCEDURE  07/29/2015    BREAST BIOPSY Bilateral 2001    x 2    CATARACT EXTRACTION  2020    COLONOSCOPY  05/14/2015    Normal; Repeat 5 years    COLONOSCOPY  08/17/2007    Normal; Repeat 7 years    COLONOSCOPY N/A 07/17/2020    The entire examined colon is normal on direct and retroflexion views; No specimens collected; Repeat 5 years    DIAGNOSTIC LAPAROSCOPY EXPLORATORY LAPAROTOMY      For endometriosis    ENDOSCOPY  07/29/2015    Medium-sized HH; Normal stomach; Normal examined duodenum; BRAVO pH capsule deployed; No specimens collected    ENDOSCOPY  05/14/2015    HH; Schatzki's ring-dilated to 20mm; LA grade B esophagitis    ENDOSCOPY N/A 12/07/2023    Medium-sized HH; There is no endoscopic evidence of Fonseca's esophagus; Normal stomach; Normal examined duodenum; No specimens collected    HYSTERECTOMY      TONSILLECTOMY      TOTAL ABDOMINAL HYSTERECTOMY WITH SALPINGO OOPHORECTOMY  1998    Dr. Aldana    WISDOM TOOTH EXTRACTION          Current Outpatient Medications:     busPIRone (BUSPAR) 7.5 MG tablet, Take 1 tablet by mouth As Needed., Disp: , Rfl:     calcium carbonate (TUMS) 500 MG chewable tablet, Chew 1 tablet As Needed for Indigestion or Heartburn., Disp: , Rfl:     desvenlafaxine (PRISTIQ) 100 MG 24 hr tablet, Take 1 tablet by mouth Daily., Disp: , Rfl:     esomeprazole (nexIUM) 40 MG capsule, Take 20 mg by mouth 2 (Two) Times a Day., Disp: , Rfl:     gabapentin (NEURONTIN) 300 MG capsule, Take 1 capsule by mouth 2 (Two) Times a Day., Disp: , Rfl:     Repatha solution prefilled syringe injection, Inject 1 mL under the skin into the  "appropriate area as directed Every 14 (Fourteen) Days., Disp: , Rfl:     vitamin D (ERGOCALCIFEROL) 1.25 MG (25403 UT) capsule capsule, Take 1 capsule by mouth 1 (One) Time Per Week., Disp: , Rfl:     No Known Allergies    Social History     Socioeconomic History    Marital status:    Tobacco Use    Smoking status: Never     Passive exposure: Past    Smokeless tobacco: Never   Vaping Use    Vaping status: Never Used   Substance and Sexual Activity    Alcohol use: Yes     Comment: Occasionally-Mixed drink once in a while    Drug use: Never    Sexual activity: Not Currently     Partners: Male     Birth control/protection: Surgical     Comment: Hysterectomy       Family History   Problem Relation Age of Onset    Stroke Father     Breast cancer Mother 60    Colon polyps Mother         Unknown age    Hypertension Mother     Prostate cancer Brother     Diabetes Brother     Colon polyps Brother         > 60 years of age    Stroke Paternal Grandfather         I think    Colon cancer Neg Hx     Esophageal cancer Neg Hx     Liver cancer Neg Hx     Liver disease Neg Hx     Rectal cancer Neg Hx     Stomach cancer Neg Hx     Ovarian cancer Neg Hx     Uterine cancer Neg Hx     Melanoma Neg Hx        Review of Systems   Constitutional:  Negative for unexpected weight change.   HENT:  Negative for trouble swallowing.    Gastrointestinal:  Negative for abdominal pain, blood in stool, constipation and diarrhea.       Objective     /82 (BP Location: Left arm, Patient Position: Sitting, Cuff Size: Adult)   Pulse 74   Temp 97 °F (36.1 °C) (Infrared)   Ht 162.6 cm (64\")   Wt 62.1 kg (137 lb)   LMP  (LMP Unknown)   SpO2 98%   Breastfeeding No   BMI 23.52 kg/m²     Physical Exam  Vitals reviewed.   Constitutional:       Appearance: Normal appearance.   Cardiovascular:      Rate and Rhythm: Normal rate and regular rhythm.      Heart sounds: Normal heart sounds.   Pulmonary:      Effort: Pulmonary effort is normal.      " Breath sounds: Normal breath sounds.   Neurological:      Mental Status: She is alert.         Lab Results - Last 18 Months   Lab Units 05/14/24  1018   HEMOGLOBIN g/dL 13.0   HEMATOCRIT % 40.4   MCV fL 87   WBC x10E3/uL 6.5   RDW % 13.0   PLATELETS x10E3/uL 342             IMPRESSION/PLAN:    Assessment & Plan      Problem List Items Addressed This Visit       Family history of polyps in the colon - Primary    Overview   Brother had polyps age greater than 60, mother had polyps age unknown  Last colonoscopy July 2020 unremarkable.         Current Assessment & Plan   Plan Colonoscopy for surveillance purposes         Relevant Medications    calcium carbonate (TUMS) 500 MG chewable tablet    Other Relevant Orders    Case Request (Completed)    Follow Anesthesia Guidelines / Protocol    Constipation    Overview   Stable Constipation associated with abdominal bloating and fullness, that has gotten better with daily stool softeners, Tried Linzess in the past but stopped that therapy due to side effects.  MiraLAX stopped to due incontinence         Current Assessment & Plan   Continue daily stool softeners I have educated her on the importance of drinking water as she is getting none of that on a daily basis.  I have also encouraged her to begin prunes daily because she states this is something she can tolerate.         Relevant Medications    calcium carbonate (TUMS) 500 MG chewable tablet    Gastroesophageal reflux disease without esophagitis    Overview   Reflux under control with daily Nexium.  She has stopped her Mobic.     Endoscopy 12/2023 shows medium size hiatal hernia.  No esophagitis or Fonseca's.  48-hour Bravo capsule on Nexium 40 mg daily in 7/2015 showed complete acid suppression.  No need to escalate treatment.  Suspect that there is some role of delayed gastric emptying contributing to symptomatology.  Little to offer in this regard other than smaller more frequent meals.         Current Assessment &  Plan   ..Pt is instructed to avoid caffeine, chocolate, peppermint and nicotine.  They are to avoid eating within 2-3 hours prior to bedtime.  I have urged patient to eat smaller amounts of food and drink rather than overeating.  Going to give her a trial of a Voquezna to see if this is something that would control her acid reflux symptoms better than her over-the-counter esomeprazole.  She is to let me know if she likes this better and if so we will try to get her a co-pay card and she can decide if it is worth the money to pay for this out-of-pocket.         Relevant Medications    calcium carbonate (TUMS) 500 MG chewable tablet     Colonoscopy  Clenpiq Prep          ..The risks, benefits, and alternatives of colonoscopy were reviewed with the patient today.  Risks including perforation of the colon possibly requiring surgery or colostomy.  Additional risks include risk of bleeding from biopsies or removal of colon tissue.  There is also the risk of a drug reaction or problems with anesthesia.  This will be discussed with the further by the anesthesia team on the day of the procedure.  Lastly there is a possibility of missing a colon polyp or cancer.  The benefits include the diagnosis and management of disease of the colon and rectum.  Alternatives to colonoscopy include barium enema, laboratory testing, radiographic evaluation, or no intervention.  The patient verbalizes understanding and agrees.    In accordance with requirements under the Affordable Care Act, [facility] has provided pricing for all hospital services and items on each of its websites. However, a patient's actual cost may differ based on the services the patient receives to meet individual healthcare needs and based on the benefits provided under the patient’s insurance coverage.        Cori Cedillo, APRN  07/30/25  11:50 CDT    Part of this note may be an electronic transcription/translation of spoken language to printed text.

## (undated) DEVICE — CONMED SCOPE SAVER BITE BLOCK, 20X27 MM: Brand: SCOPE SAVER

## (undated) DEVICE — THE CHANNEL CLEANING BRUSH IS A NYLON FLEXI BRUSH ATTACHED TO A FLEXIBLE PLASTIC SHEATH DESIGNED TO SAFELY REMOVE DEBRIS FROM FLEXIBLE ENDOSCOPES.

## (undated) DEVICE — SENSR O2 OXIMAX FNGR A/ 18IN NONSTR

## (undated) DEVICE — TBG SMPL FLTR LINE NASL 02/C02 A/ BX/100

## (undated) DEVICE — Device: Brand: DEFENDO AIR/WATER/SUCTION AND BIOPSY VALVE

## (undated) DEVICE — YANKAUER,BULB TIP WITH VENT: Brand: ARGYLE

## (undated) DEVICE — CUFF,BP,DISP,1 TUBE,ADULT,HP: Brand: MEDLINE

## (undated) DEVICE — MASK,OXYGEN,MED CONC,ADLT,7' TUB, UC: Brand: PENDING